# Patient Record
Sex: FEMALE | Race: WHITE | NOT HISPANIC OR LATINO | URBAN - METROPOLITAN AREA
[De-identification: names, ages, dates, MRNs, and addresses within clinical notes are randomized per-mention and may not be internally consistent; named-entity substitution may affect disease eponyms.]

---

## 2018-09-14 ENCOUNTER — OUTPATIENT (OUTPATIENT)
Dept: OUTPATIENT SERVICES | Facility: HOSPITAL | Age: 78
LOS: 1 days | End: 2018-09-14

## 2020-02-10 ENCOUNTER — APPOINTMENT (OUTPATIENT)
Dept: CARDIOLOGY | Facility: CLINIC | Age: 80
End: 2020-02-10
Payer: MEDICARE

## 2020-02-10 VITALS
WEIGHT: 224 LBS | DIASTOLIC BLOOD PRESSURE: 58 MMHG | SYSTOLIC BLOOD PRESSURE: 122 MMHG | HEIGHT: 64 IN | BODY MASS INDEX: 38.24 KG/M2 | OXYGEN SATURATION: 96 % | HEART RATE: 83 BPM

## 2020-02-10 DIAGNOSIS — I12.9 HYPERTENSIVE CHRONIC KIDNEY DISEASE WITH STAGE 1 THROUGH STAGE 4 CHRONIC KIDNEY DISEASE, OR UNSPECIFIED CHRONIC KIDNEY DISEASE: ICD-10-CM

## 2020-02-10 DIAGNOSIS — Z83.3 FAMILY HISTORY OF DIABETES MELLITUS: ICD-10-CM

## 2020-02-10 DIAGNOSIS — Z80.0 FAMILY HISTORY OF MALIGNANT NEOPLASM OF DIGESTIVE ORGANS: ICD-10-CM

## 2020-02-10 DIAGNOSIS — Z78.9 OTHER SPECIFIED HEALTH STATUS: ICD-10-CM

## 2020-02-10 DIAGNOSIS — Z82.0 FAMILY HISTORY OF EPILEPSY AND OTHER DISEASES OF THE NERVOUS SYSTEM: ICD-10-CM

## 2020-02-10 DIAGNOSIS — Z60.2 PROBLEMS RELATED TO LIVING ALONE: ICD-10-CM

## 2020-02-10 DIAGNOSIS — H93.8X9 OTHER SPECIFIED DISORDERS OF EAR, UNSPECIFIED EAR: ICD-10-CM

## 2020-02-10 DIAGNOSIS — Z82.49 FAMILY HISTORY OF ISCHEMIC HEART DISEASE AND OTHER DISEASES OF THE CIRCULATORY SYSTEM: ICD-10-CM

## 2020-02-10 PROCEDURE — 99203 OFFICE O/P NEW LOW 30 MIN: CPT

## 2020-02-10 SDOH — SOCIAL STABILITY - SOCIAL INSECURITY: PROBLEMS RELATED TO LIVING ALONE: Z60.2

## 2020-02-10 NOTE — PHYSICAL EXAM
[General Appearance - Well Developed] : well developed [Well Groomed] : well groomed [Normal Appearance] : normal appearance [General Appearance - Well Nourished] : well nourished [No Deformities] : no deformities [General Appearance - In No Acute Distress] : no acute distress [Normal Conjunctiva] : the conjunctiva exhibited no abnormalities [Eyelids - No Xanthelasma] : the eyelids demonstrated no xanthelasmas [Normal Oral Mucosa] : normal oral mucosa [No Oral Pallor] : no oral pallor [No Oral Cyanosis] : no oral cyanosis [Normal Jugular Venous A Waves Present] : normal jugular venous A waves present [Normal Jugular Venous V Waves Present] : normal jugular venous V waves present [No Jugular Venous Garcia A Waves] : no jugular venous garcia A waves [Heart Rate And Rhythm] : heart rate and rhythm were normal [Murmurs] : no murmurs present [Heart Sounds] : normal S1 and S2 [Respiration, Rhythm And Depth] : normal respiratory rhythm and effort [Auscultation Breath Sounds / Voice Sounds] : lungs were clear to auscultation bilaterally [Exaggerated Use Of Accessory Muscles For Inspiration] : no accessory muscle use [Abdomen Soft] : soft [Abdomen Mass (___ Cm)] : no abdominal mass palpated [Abdomen Tenderness] : non-tender [Gait - Sufficient For Exercise Testing] : the gait was sufficient for exercise testing [Abnormal Walk] : normal gait [Nail Clubbing] : no clubbing of the fingernails [Cyanosis, Localized] : no localized cyanosis [Petechial Hemorrhages (___cm)] : no petechial hemorrhages [Skin Color & Pigmentation] : normal skin color and pigmentation [] : no rash [No Venous Stasis] : no venous stasis [Skin Lesions] : no skin lesions [No Skin Ulcers] : no skin ulcer [No Xanthoma] : no  xanthoma was observed [Oriented To Time, Place, And Person] : oriented to person, place, and time [Affect] : the affect was normal [Mood] : the mood was normal [No Anxiety] : not feeling anxious

## 2020-02-10 NOTE — ASSESSMENT
[FreeTextEntry1] : Serenity is a 79-year-old female with medical history detailed above and active medical issues including:\par \par -Patient has dyspnea with moderate exertion, abnormal baseline EKG, multiple cardiac risk factors.  Patient will have noninvasive testing with a Lexiscan Myoview stress test to assess for obstructive CAD, exercise-induced arrhythmia,  blood pressure response, 2DEcho for LVEF, wall motion, structural heart disease,  carotid and abdominal ultrasound to assess for obstructive PAD. \par \par - Palpitations PVCs, unknown recent LVEF.  No recent palpitations.\par \par -Dependent leg edema, intermittent use of Lasix.  Patient instructed to maintain low salt diet, elevate legs when nonambulatory, knee-high compression stockings during the day and with prolonged travel.\par \par - Hypertension on losartan 50mg daily\par \par -Moderate obesity.  Discussed calorie reduction and increased exercise as a weight reduction strategy.\par \par -Family history of premature CAD, father MI age 62\par \par Patient will be seen in cardiology follow-up after noninvasive testing.\par \par Advised patient to follow active lifestyle with regular cardiovascular exercise. Patient educated on lifestyle and diet modification with low sodium low fat diet and avoidance of excessive alcohol. Patient is aware to call with any symptoms or concerns. \par \par Serenity will follow-up with Dr. Alvina Chilel for primary care

## 2020-02-10 NOTE — REASON FOR VISIT
[Consultation] : a consultation regarding [FreeTextEntry2] : DEL CID, edema, palpitation, PVCs, family history of premature CAD [FreeTextEntry1] : Serenity is a 79-year-old female with history of hypertension, palpitations, PVCs, obesity, dependent leg edema, family history of premature CAD.\par \par Patient has dyspnea with moderate exertion.  Patient has dependent leg edema on Lasix as needed.  patient has recurrent palpitations described as a fluttering no associated symptoms.  No recent palpitations.  Cardiovascular review of symptoms is negative for exertional chest pain, dizziness or syncope.  No PND or orthopnea.  No bleeding or black stool.\par \par No exercise routine.  It is walking less than 5 minutes unable to complete a treadmill stress test.  Patient will be scheduled for pharmacologic stress test.\par \par EKG sinus rhythm age undetermined septal MI unchanged from EKG December 2006\par \par Cardiology evaluation 2008 [facility], Dr. Pimentel for palpitations, abnormal Myoview stress test mild AMI abnormal EKG response, LVEF 58%.\par \par

## 2020-02-24 ENCOUNTER — APPOINTMENT (OUTPATIENT)
Dept: CARDIOLOGY | Facility: CLINIC | Age: 80
End: 2020-02-24
Payer: MEDICARE

## 2020-02-24 PROCEDURE — 93306 TTE W/DOPPLER COMPLETE: CPT

## 2020-02-24 PROCEDURE — 93979 VASCULAR STUDY: CPT

## 2020-02-24 PROCEDURE — 93880 EXTRACRANIAL BILAT STUDY: CPT

## 2020-07-02 ENCOUNTER — APPOINTMENT (OUTPATIENT)
Dept: CARDIOLOGY | Facility: CLINIC | Age: 80
End: 2020-07-02

## 2020-09-17 ENCOUNTER — APPOINTMENT (OUTPATIENT)
Dept: CARDIOLOGY | Facility: CLINIC | Age: 80
End: 2020-09-17

## 2020-09-25 ENCOUNTER — APPOINTMENT (OUTPATIENT)
Dept: CARDIOLOGY | Facility: CLINIC | Age: 80
End: 2020-09-25

## 2021-01-09 ENCOUNTER — TRANSCRIPTION ENCOUNTER (OUTPATIENT)
Age: 81
End: 2021-01-09

## 2021-02-06 ENCOUNTER — TRANSCRIPTION ENCOUNTER (OUTPATIENT)
Age: 81
End: 2021-02-06

## 2021-04-02 ENCOUNTER — OUTPATIENT (OUTPATIENT)
Dept: OUTPATIENT SERVICES | Facility: HOSPITAL | Age: 81
LOS: 1 days | End: 2021-04-02

## 2021-09-03 ENCOUNTER — APPOINTMENT (OUTPATIENT)
Dept: OPHTHALMOLOGY | Facility: CLINIC | Age: 81
End: 2021-09-03
Payer: MEDICARE

## 2021-09-03 ENCOUNTER — NON-APPOINTMENT (OUTPATIENT)
Age: 81
End: 2021-09-03

## 2021-09-03 PROCEDURE — 92004 COMPRE OPH EXAM NEW PT 1/>: CPT

## 2021-09-14 ENCOUNTER — NON-APPOINTMENT (OUTPATIENT)
Age: 81
End: 2021-09-14

## 2021-09-14 ENCOUNTER — APPOINTMENT (OUTPATIENT)
Dept: OPHTHALMOLOGY | Facility: CLINIC | Age: 81
End: 2021-09-14
Payer: MEDICARE

## 2021-09-14 PROCEDURE — 99213 OFFICE O/P EST LOW 20 MIN: CPT

## 2021-09-14 PROCEDURE — 76519 ECHO EXAM OF EYE: CPT

## 2021-09-23 ENCOUNTER — APPOINTMENT (OUTPATIENT)
Dept: OPHTHALMOLOGY | Facility: CLINIC | Age: 81
End: 2021-09-23

## 2021-10-01 ENCOUNTER — APPOINTMENT (OUTPATIENT)
Dept: OPHTHALMOLOGY | Facility: CLINIC | Age: 81
End: 2021-10-01

## 2021-10-14 ENCOUNTER — APPOINTMENT (OUTPATIENT)
Dept: OPHTHALMOLOGY | Facility: CLINIC | Age: 81
End: 2021-10-14

## 2021-10-22 ENCOUNTER — APPOINTMENT (OUTPATIENT)
Dept: OPHTHALMOLOGY | Facility: CLINIC | Age: 81
End: 2021-10-22

## 2021-11-10 ENCOUNTER — APPOINTMENT (OUTPATIENT)
Dept: OPHTHALMOLOGY | Facility: AMBULATORY MEDICAL SERVICES | Age: 81
End: 2021-11-10

## 2021-11-11 ENCOUNTER — APPOINTMENT (OUTPATIENT)
Dept: OPHTHALMOLOGY | Facility: CLINIC | Age: 81
End: 2021-11-11

## 2021-11-19 ENCOUNTER — APPOINTMENT (OUTPATIENT)
Dept: OPHTHALMOLOGY | Facility: CLINIC | Age: 81
End: 2021-11-19

## 2022-02-09 ENCOUNTER — APPOINTMENT (OUTPATIENT)
Dept: OPHTHALMOLOGY | Facility: AMBULATORY MEDICAL SERVICES | Age: 82
End: 2022-02-09

## 2022-02-10 ENCOUNTER — APPOINTMENT (OUTPATIENT)
Dept: OPHTHALMOLOGY | Facility: CLINIC | Age: 82
End: 2022-02-10

## 2022-02-18 ENCOUNTER — APPOINTMENT (OUTPATIENT)
Dept: OPHTHALMOLOGY | Facility: CLINIC | Age: 82
End: 2022-02-18

## 2022-03-03 ENCOUNTER — APPOINTMENT (OUTPATIENT)
Dept: OPHTHALMOLOGY | Facility: CLINIC | Age: 82
End: 2022-03-03
Payer: MEDICARE

## 2022-03-03 ENCOUNTER — NON-APPOINTMENT (OUTPATIENT)
Age: 82
End: 2022-03-03

## 2022-03-03 PROCEDURE — 92014 COMPRE OPH EXAM EST PT 1/>: CPT

## 2022-03-09 ENCOUNTER — NON-APPOINTMENT (OUTPATIENT)
Age: 82
End: 2022-03-09

## 2022-03-09 ENCOUNTER — APPOINTMENT (OUTPATIENT)
Dept: CARDIOLOGY | Facility: CLINIC | Age: 82
End: 2022-03-09
Payer: MEDICARE

## 2022-03-09 VITALS
TEMPERATURE: 98 F | DIASTOLIC BLOOD PRESSURE: 80 MMHG | SYSTOLIC BLOOD PRESSURE: 118 MMHG | BODY MASS INDEX: 41.46 KG/M2 | WEIGHT: 234 LBS | OXYGEN SATURATION: 98 % | HEIGHT: 63 IN | HEART RATE: 92 BPM

## 2022-03-09 PROCEDURE — 99215 OFFICE O/P EST HI 40 MIN: CPT

## 2022-03-09 PROCEDURE — 93000 ELECTROCARDIOGRAM COMPLETE: CPT

## 2022-03-09 RX ORDER — FUROSEMIDE 10 MG/ML
10 SOLUTION ORAL
Refills: 0 | Status: DISCONTINUED | COMMUNITY
End: 2022-03-09

## 2022-03-09 RX ORDER — ATORVASTATIN CALCIUM 10 MG/1
10 TABLET, FILM COATED ORAL DAILY
Refills: 0 | Status: DISCONTINUED | COMMUNITY
End: 2022-03-09

## 2022-03-09 NOTE — PHYSICAL EXAM
[General Appearance - Well Developed] : well developed [Normal Appearance] : normal appearance [Well Groomed] : well groomed [General Appearance - Well Nourished] : well nourished [No Deformities] : no deformities [General Appearance - In No Acute Distress] : no acute distress [Normal Conjunctiva] : the conjunctiva exhibited no abnormalities [Eyelids - No Xanthelasma] : the eyelids demonstrated no xanthelasmas [Normal Oral Mucosa] : normal oral mucosa [No Oral Pallor] : no oral pallor [No Oral Cyanosis] : no oral cyanosis [Normal Jugular Venous V Waves Present] : normal jugular venous V waves present [No Jugular Venous Garcia A Waves] : no jugular venous garcia A waves [Heart Rate And Rhythm] : heart rate and rhythm were normal [Respiration, Rhythm And Depth] : normal respiratory rhythm and effort [Abdomen Soft] : soft [Abdomen Tenderness] : non-tender [Nail Clubbing] : no clubbing of the fingernails [Cyanosis, Localized] : no localized cyanosis [Skin Color & Pigmentation] : normal skin color and pigmentation [] : no rash [No Venous Stasis] : no venous stasis [Skin Lesions] : no skin lesions [No Skin Ulcers] : no skin ulcer [No Xanthoma] : no  xanthoma was observed [Oriented To Time, Place, And Person] : oriented to person, place, and time [Affect] : the affect was normal [Mood] : the mood was normal [No Anxiety] : not feeling anxious [Auscultation Breath Sounds / Voice Sounds] : lungs were clear to auscultation bilaterally [FreeTextEntry1] : 1+ edema b/l

## 2022-03-09 NOTE — DISCUSSION/SUMMARY
[FreeTextEntry1] : \par 82 yo F with moderate carotid stenosis, HTN, HLD, obesity, former smoker, CKD here for follow up.\par \par \par # Progressive DEL CID: abnormal NST in 2/2020 and q wave anterior on ekg\par - TTE given murmur and sx\par - consideration was for CCTA however I am wishing to avoid contrast unless for a coronary angiogram. Will start with a pharm NST and very low threshold for a coronary angiogram given risk factors/sx/abnormal ekg\par - increase lasix to 20 po for now. labs ordered.\par - Patient instructed to maintain low salt diet, elevate legs when nonambulatory, knee-high compression stockings during the day and with prolonged travel.\par \par # HTN well controlled:\par - continue losartan 50. lasix 20. low Na diet.\par \par # PAD (mod b/l carotid stenosis):\par - recheck ordered\par - start rosuva 5\par - further discuss asa 81 at OV\par \par # Obesity: \par - need to further discuss home sleep study\par \par \par Follow up after testing. \par

## 2022-03-09 NOTE — REASON FOR VISIT
[Follow-Up - Clinic] : a clinic follow-up of [Abnormal Test Result] : an abnormal test result [Peripheral Vascular Disease] : peripheral vascular disease

## 2022-03-09 NOTE — HISTORY OF PRESENT ILLNESS
[FreeTextEntry1] : \par 82 yo F with moderate carotid stenosis, HTN, HLD, obesity, former smoker, CKD here for follow up.\par \par 3/2022:\par [facility] ER for DEL CID. DEL CID worsening over past 2 weeks with cough. Baseline LE edema. No angina. had abnormal tread nst 2/2020.\par \par 2020: "dyspnea with moderate exertion.  Patient has dependent leg edema on Lasix as needed.  patient has recurrent palpitations described as a fluttering no associated symptoms."\par \par \par TESTING:\par 3/2022:\par LABWORK: Cr 1.19. BUN 23. normal LFT. NTBNP 85. normal cbc.\par EKG: q wave anterior\par \par \par 2/2020:\par TREADMILL NUCLEAR STRESS TEST: 7 METS ischemic ekg and mild infarction anterior region\par CAROTID DUPLEX: moderate bilateral\par TTE: grossly unremarkable. G1DD. \par no AAA\par \par \par

## 2022-03-22 ENCOUNTER — APPOINTMENT (OUTPATIENT)
Dept: CARDIOLOGY | Facility: CLINIC | Age: 82
End: 2022-03-22
Payer: MEDICARE

## 2022-03-22 ENCOUNTER — NON-APPOINTMENT (OUTPATIENT)
Age: 82
End: 2022-03-22

## 2022-03-22 PROCEDURE — 93306 TTE W/DOPPLER COMPLETE: CPT

## 2022-03-22 PROCEDURE — 93880 EXTRACRANIAL BILAT STUDY: CPT

## 2022-03-23 ENCOUNTER — APPOINTMENT (OUTPATIENT)
Dept: OPHTHALMOLOGY | Facility: AMBULATORY MEDICAL SERVICES | Age: 82
End: 2022-03-23
Payer: MEDICARE

## 2022-03-23 PROCEDURE — 66984 XCAPSL CTRC RMVL W/O ECP: CPT | Mod: LT

## 2022-03-24 ENCOUNTER — NON-APPOINTMENT (OUTPATIENT)
Age: 82
End: 2022-03-24

## 2022-03-24 ENCOUNTER — APPOINTMENT (OUTPATIENT)
Dept: OPHTHALMOLOGY | Facility: CLINIC | Age: 82
End: 2022-03-24
Payer: MEDICARE

## 2022-03-24 PROCEDURE — 99024 POSTOP FOLLOW-UP VISIT: CPT

## 2022-03-31 ENCOUNTER — APPOINTMENT (OUTPATIENT)
Dept: CARDIOLOGY | Facility: CLINIC | Age: 82
End: 2022-03-31

## 2022-03-31 ENCOUNTER — NON-APPOINTMENT (OUTPATIENT)
Age: 82
End: 2022-03-31

## 2022-04-01 ENCOUNTER — APPOINTMENT (OUTPATIENT)
Dept: OPHTHALMOLOGY | Facility: CLINIC | Age: 82
End: 2022-04-01
Payer: MEDICARE

## 2022-04-01 ENCOUNTER — NON-APPOINTMENT (OUTPATIENT)
Age: 82
End: 2022-04-01

## 2022-04-01 PROCEDURE — 99024 POSTOP FOLLOW-UP VISIT: CPT

## 2022-04-04 ENCOUNTER — APPOINTMENT (OUTPATIENT)
Dept: CARDIOLOGY | Facility: CLINIC | Age: 82
End: 2022-04-04
Payer: MEDICARE

## 2022-04-04 VITALS
HEIGHT: 63 IN | OXYGEN SATURATION: 97 % | HEART RATE: 80 BPM | TEMPERATURE: 97.1 F | SYSTOLIC BLOOD PRESSURE: 118 MMHG | DIASTOLIC BLOOD PRESSURE: 70 MMHG | WEIGHT: 228 LBS | BODY MASS INDEX: 40.4 KG/M2

## 2022-04-04 PROCEDURE — 99215 OFFICE O/P EST HI 40 MIN: CPT

## 2022-04-04 NOTE — HISTORY OF PRESENT ILLNESS
[FreeTextEntry1] : \par 80 yo F with moderate carotid stenosis, HTN, HLD, obesity, former smoker, CKD here for follow up.\par \par 4/2022: interim testing. tte and carotid, discussion regarding ischemia evaluation testing she declined stress test. reports dyspnea unchanged. weight loss. CXR small airway disease. \par \par 3/2022:\par [facility] ER for DEL CID. DEL CID worsening over past 2 weeks with cough. Baseline LE edema. No angina. had abnormal tread nst 2/2020.\par \par 2020: "dyspnea with moderate exertion.  Patient has dependent leg edema on Lasix as needed.  patient has recurrent palpitations described as a fluttering no associated symptoms."\par \par \par TESTING:\par 3/2022:\par LABWORK: Cr 1.22. normal lft. a1c 6.3. normal tft. hgb 12.5. . trig 168. \par CAROTID DUPLEX: moderate bilateral\par TTE: preserved biventricular function. hyperdynamic. normal rv. \par LABWORK: Cr 1.19. BUN 23. normal LFT. NTBNP 85. normal cbc.\par EKG: q wave anterior\par CXR: mild peribronchial thickening \par \par 2/2020:\par TREADMILL NUCLEAR STRESS TEST: 7 METS ischemic ekg and mild infarction anterior region\par CAROTID DUPLEX: moderate bilateral\par TTE: grossly unremarkable. G1DD. \par no AAA\par \par \par

## 2022-04-04 NOTE — PHYSICAL EXAM
[General Appearance - Well Developed] : well developed [Normal Appearance] : normal appearance [Well Groomed] : well groomed [General Appearance - Well Nourished] : well nourished [No Deformities] : no deformities [General Appearance - In No Acute Distress] : no acute distress [Normal Conjunctiva] : the conjunctiva exhibited no abnormalities [Eyelids - No Xanthelasma] : the eyelids demonstrated no xanthelasmas [Normal Oral Mucosa] : normal oral mucosa [No Oral Pallor] : no oral pallor [No Oral Cyanosis] : no oral cyanosis [Normal Jugular Venous V Waves Present] : normal jugular venous V waves present [No Jugular Venous Garcia A Waves] : no jugular venous garcia A waves [Heart Rate And Rhythm] : heart rate and rhythm were normal [Respiration, Rhythm And Depth] : normal respiratory rhythm and effort [Auscultation Breath Sounds / Voice Sounds] : lungs were clear to auscultation bilaterally [Abdomen Soft] : soft [Abdomen Tenderness] : non-tender [Nail Clubbing] : no clubbing of the fingernails [Cyanosis, Localized] : no localized cyanosis [Skin Color & Pigmentation] : normal skin color and pigmentation [] : no rash [No Venous Stasis] : no venous stasis [Skin Lesions] : no skin lesions [No Skin Ulcers] : no skin ulcer [No Xanthoma] : no  xanthoma was observed [Oriented To Time, Place, And Person] : oriented to person, place, and time [Affect] : the affect was normal [Mood] : the mood was normal [No Anxiety] : not feeling anxious [FreeTextEntry1] : 1+ edema b/l

## 2022-04-04 NOTE — REASON FOR VISIT
[Follow-Up - Clinic] : a clinic follow-up of [Abnormal Test Result] : an abnormal test result [Peripheral Vascular Disease] : peripheral vascular disease [Dyspnea] : dyspnea [Medication Management] : Medication management

## 2022-04-15 ENCOUNTER — INPATIENT (INPATIENT)
Facility: HOSPITAL | Age: 82
LOS: 0 days | Discharge: HOME | End: 2022-04-16
Attending: INTERNAL MEDICINE | Admitting: INTERNAL MEDICINE
Payer: MEDICARE

## 2022-04-15 PROCEDURE — 92920 PRQ TRLUML C ANGIOP 1ART&/BR: CPT | Mod: LD

## 2022-04-15 PROCEDURE — 93010 ELECTROCARDIOGRAM REPORT: CPT | Mod: 76

## 2022-04-15 PROCEDURE — 99223 1ST HOSP IP/OBS HIGH 75: CPT

## 2022-04-15 PROCEDURE — 93458 L HRT ARTERY/VENTRICLE ANGIO: CPT | Mod: 26,XU

## 2022-04-16 ENCOUNTER — NON-APPOINTMENT (OUTPATIENT)
Age: 82
End: 2022-04-16

## 2022-04-16 PROCEDURE — 93010 ELECTROCARDIOGRAM REPORT: CPT

## 2022-04-16 PROCEDURE — 99239 HOSP IP/OBS DSCHRG MGMT >30: CPT

## 2022-04-18 RX ORDER — CLOPIDOGREL BISULFATE 75 MG/1
75 TABLET, FILM COATED ORAL DAILY
Qty: 7 | Refills: 0 | Status: DISCONTINUED | COMMUNITY
Start: 2022-04-17 | End: 2022-04-18

## 2022-04-19 ENCOUNTER — OUTPATIENT (OUTPATIENT)
Dept: OUTPATIENT SERVICES | Facility: HOSPITAL | Age: 82
LOS: 1 days | Discharge: HOME | End: 2022-04-19
Payer: MEDICARE

## 2022-04-19 DIAGNOSIS — I12.9 HYPERTENSIVE CHRONIC KIDNEY DISEASE WITH STAGE 1 THROUGH STAGE 4 CHRONIC KIDNEY DISEASE, OR UNSPECIFIED CHRONIC KIDNEY DISEASE: ICD-10-CM

## 2022-04-19 DIAGNOSIS — Z87.891 PERSONAL HISTORY OF NICOTINE DEPENDENCE: ICD-10-CM

## 2022-04-19 DIAGNOSIS — I73.9 PERIPHERAL VASCULAR DISEASE, UNSPECIFIED: ICD-10-CM

## 2022-04-19 DIAGNOSIS — Z82.49 FAMILY HISTORY OF ISCHEMIC HEART DISEASE AND OTHER DISEASES OF THE CIRCULATORY SYSTEM: ICD-10-CM

## 2022-04-19 DIAGNOSIS — I25.10 ATHEROSCLEROTIC HEART DISEASE OF NATIVE CORONARY ARTERY WITHOUT ANGINA PECTORIS: ICD-10-CM

## 2022-04-19 DIAGNOSIS — I65.23 OCCLUSION AND STENOSIS OF BILATERAL CAROTID ARTERIES: ICD-10-CM

## 2022-04-19 DIAGNOSIS — Z83.3 FAMILY HISTORY OF DIABETES MELLITUS: ICD-10-CM

## 2022-04-19 DIAGNOSIS — R73.03 PREDIABETES: ICD-10-CM

## 2022-04-19 DIAGNOSIS — Z20.822 CONTACT WITH AND (SUSPECTED) EXPOSURE TO COVID-19: ICD-10-CM

## 2022-04-19 DIAGNOSIS — E66.9 OBESITY, UNSPECIFIED: ICD-10-CM

## 2022-04-19 DIAGNOSIS — N18.9 CHRONIC KIDNEY DISEASE, UNSPECIFIED: ICD-10-CM

## 2022-04-19 DIAGNOSIS — I25.111 ATHEROSCLEROTIC HEART DISEASE OF NATIVE CORONARY ARTERY WITH ANGINA PECTORIS WITH DOCUMENTED SPASM: ICD-10-CM

## 2022-04-19 PROCEDURE — 92978 ENDOLUMINL IVUS OCT C 1ST: CPT | Mod: 26,RC

## 2022-04-19 PROCEDURE — 93010 ELECTROCARDIOGRAM REPORT: CPT | Mod: 77

## 2022-04-19 PROCEDURE — 93458 L HRT ARTERY/VENTRICLE ANGIO: CPT | Mod: 26,XU

## 2022-04-19 PROCEDURE — 92928 PRQ TCAT PLMT NTRAC ST 1 LES: CPT | Mod: RC

## 2022-04-19 PROCEDURE — 93010 ELECTROCARDIOGRAM REPORT: CPT

## 2022-04-20 DIAGNOSIS — N18.9 CHRONIC KIDNEY DISEASE, UNSPECIFIED: ICD-10-CM

## 2022-04-20 DIAGNOSIS — I12.9 HYPERTENSIVE CHRONIC KIDNEY DISEASE WITH STAGE 1 THROUGH STAGE 4 CHRONIC KIDNEY DISEASE, OR UNSPECIFIED CHRONIC KIDNEY DISEASE: ICD-10-CM

## 2022-04-20 DIAGNOSIS — R06.02 SHORTNESS OF BREATH: ICD-10-CM

## 2022-04-20 DIAGNOSIS — Z82.49 FAMILY HISTORY OF ISCHEMIC HEART DISEASE AND OTHER DISEASES OF THE CIRCULATORY SYSTEM: ICD-10-CM

## 2022-04-20 DIAGNOSIS — E66.9 OBESITY, UNSPECIFIED: ICD-10-CM

## 2022-04-20 DIAGNOSIS — R60.0 LOCALIZED EDEMA: ICD-10-CM

## 2022-04-20 DIAGNOSIS — I25.10 ATHEROSCLEROTIC HEART DISEASE OF NATIVE CORONARY ARTERY WITHOUT ANGINA PECTORIS: ICD-10-CM

## 2022-04-20 DIAGNOSIS — E78.5 HYPERLIPIDEMIA, UNSPECIFIED: ICD-10-CM

## 2022-04-20 DIAGNOSIS — Z79.02 LONG TERM (CURRENT) USE OF ANTITHROMBOTICS/ANTIPLATELETS: ICD-10-CM

## 2022-04-20 DIAGNOSIS — I65.23 OCCLUSION AND STENOSIS OF BILATERAL CAROTID ARTERIES: ICD-10-CM

## 2022-04-20 DIAGNOSIS — R73.03 PREDIABETES: ICD-10-CM

## 2022-04-20 DIAGNOSIS — Z79.82 LONG TERM (CURRENT) USE OF ASPIRIN: ICD-10-CM

## 2022-04-20 DIAGNOSIS — Z87.891 PERSONAL HISTORY OF NICOTINE DEPENDENCE: ICD-10-CM

## 2022-04-21 ENCOUNTER — NON-APPOINTMENT (OUTPATIENT)
Age: 82
End: 2022-04-21

## 2022-04-22 ENCOUNTER — NON-APPOINTMENT (OUTPATIENT)
Age: 82
End: 2022-04-22

## 2022-04-22 ENCOUNTER — APPOINTMENT (OUTPATIENT)
Dept: CARDIOLOGY | Facility: CLINIC | Age: 82
End: 2022-04-22
Payer: MEDICARE

## 2022-04-22 VITALS
HEIGHT: 63 IN | TEMPERATURE: 96.9 F | SYSTOLIC BLOOD PRESSURE: 130 MMHG | BODY MASS INDEX: 42.17 KG/M2 | WEIGHT: 238 LBS | HEART RATE: 82 BPM | OXYGEN SATURATION: 98 % | DIASTOLIC BLOOD PRESSURE: 70 MMHG

## 2022-04-22 PROCEDURE — 93000 ELECTROCARDIOGRAM COMPLETE: CPT

## 2022-04-22 PROCEDURE — 99215 OFFICE O/P EST HI 40 MIN: CPT

## 2022-04-22 RX ORDER — LOTEPREDNOL ETABONATE 10 MG/ML
1 SUSPENSION TOPICAL
Qty: 3 | Refills: 0 | Status: DISCONTINUED | COMMUNITY
Start: 2022-03-03 | End: 2022-04-22

## 2022-04-22 RX ORDER — BROMFENAC SODIUM 0.7 MG/ML
0.07 SOLUTION/ DROPS OPHTHALMIC
Qty: 3 | Refills: 0 | Status: DISCONTINUED | COMMUNITY
Start: 2021-09-14 | End: 2022-04-22

## 2022-04-22 RX ORDER — MOXIFLOXACIN OPHTHALMIC 5 MG/ML
0.5 SOLUTION/ DROPS OPHTHALMIC
Qty: 3 | Refills: 0 | Status: DISCONTINUED | COMMUNITY
Start: 2022-03-03 | End: 2022-04-22

## 2022-04-22 NOTE — HISTORY OF PRESENT ILLNESS
[FreeTextEntry1] : \par 82 yo F with CAD with PCI proximal RCA 4/2022, moderate carotid stenosis, HTN, HLD, obesity, former smoker, CKD here for follow up.\par \par 4/22/22 VISIT: Underwent PCI.  Right radial site well-healing.  Patent neurovascular status.  Testing as detailed below.  Creatinine improved to 1.0.  Her dyspnea has much improved/resolved.  She does now have palpitations appears to correlate with PVCs today.  No angina.  Not taking Lasix as prescribed.  Did increase her rosuvastatin.\par \par 4/4/2022: interim testing. tte and carotid, discussion regarding ischemia evaluation testing she declined stress test. reports dyspnea unchanged. weight loss. CXR small airway disease. \par \par 3/2022: [facility] ER for DEL CID. DEL CID worsening over past 2 weeks with cough. Baseline LE edema. No angina. had abnormal tread nst 2/2020.\par \par 2020: "dyspnea with moderate exertion.  Patient has dependent leg edema on Lasix as needed.  patient has recurrent palpitations described as a fluttering no associated symptoms."\par \par \par TESTING:\par 4/2022:\par CORONARY ANGIOGRAM: IVUS guided PCI proximal RCA 2.75 x 18 [place] point (postdilated 3.5).  Mildly elevated LVEDP.  D2 50%.\par EKG: NSR with PVCs.\par \par 3/2022:\par LABWORK: Cr 1.22. normal lft. a1c 6.3. normal tft. hgb 12.5. . trig 168. \par CAROTID DUPLEX: moderate bilateral\par TTE: preserved biventricular function. hyperdynamic. normal rv. \par LABWORK: Cr 1.19. BUN 23. normal LFT. NTBNP 85. normal cbc.\par EKG: q wave anterior\par CXR: mild peribronchial thickening \par \par 2/2020:\par TREADMILL NUCLEAR STRESS TEST: 7 METS ischemic ekg and mild infarction anterior region\par CAROTID DUPLEX: moderate bilateral\par TTE: grossly unremarkable. G1DD. \par no AAA\par \par \par

## 2022-04-22 NOTE — PHYSICAL EXAM
[General Appearance - Well Developed] : well developed [Normal Appearance] : normal appearance [Well Groomed] : well groomed [General Appearance - Well Nourished] : well nourished [No Deformities] : no deformities [General Appearance - In No Acute Distress] : no acute distress [Normal Conjunctiva] : the conjunctiva exhibited no abnormalities [Eyelids - No Xanthelasma] : the eyelids demonstrated no xanthelasmas [Normal Oral Mucosa] : normal oral mucosa [No Oral Pallor] : no oral pallor [No Oral Cyanosis] : no oral cyanosis [Normal Jugular Venous V Waves Present] : normal jugular venous V waves present [No Jugular Venous Garcia A Waves] : no jugular venous garcia A waves [Heart Rate And Rhythm] : heart rate and rhythm were normal [Respiration, Rhythm And Depth] : normal respiratory rhythm and effort [Auscultation Breath Sounds / Voice Sounds] : lungs were clear to auscultation bilaterally [Abdomen Soft] : soft [Abdomen Tenderness] : non-tender [Nail Clubbing] : no clubbing of the fingernails [Cyanosis, Localized] : no localized cyanosis [Skin Color & Pigmentation] : normal skin color and pigmentation [] : no rash [No Venous Stasis] : no venous stasis [Skin Lesions] : no skin lesions [No Skin Ulcers] : no skin ulcer [No Xanthoma] : no  xanthoma was observed [Oriented To Time, Place, And Person] : oriented to person, place, and time [Affect] : the affect was normal [Mood] : the mood was normal [No Anxiety] : not feeling anxious [FreeTextEntry1] : Trace edema b/l

## 2022-04-22 NOTE — REASON FOR VISIT
[Follow-Up - Clinic] : a clinic follow-up of [Abnormal Test Result] : an abnormal test result [Dyspnea] : dyspnea [Medication Management] : Medication management [Peripheral Vascular Disease] : peripheral vascular disease [Symptom and Test Evaluation] : symptom and test evaluation [CV Risk Factors and Non-Cardiac Disease] : CV risk factors and non-cardiac disease [Coronary Artery Disease] : coronary artery disease

## 2022-04-24 ENCOUNTER — NON-APPOINTMENT (OUTPATIENT)
Age: 82
End: 2022-04-24

## 2022-04-28 PROCEDURE — 93224 XTRNL ECG REC UP TO 48 HRS: CPT

## 2022-05-03 ENCOUNTER — APPOINTMENT (OUTPATIENT)
Dept: OPHTHALMOLOGY | Facility: CLINIC | Age: 82
End: 2022-05-03

## 2022-05-05 ENCOUNTER — NON-APPOINTMENT (OUTPATIENT)
Age: 82
End: 2022-05-05

## 2022-05-10 ENCOUNTER — OUTPATIENT (OUTPATIENT)
Dept: OUTPATIENT SERVICES | Facility: HOSPITAL | Age: 82
LOS: 1 days | End: 2022-05-10

## 2022-05-11 ENCOUNTER — APPOINTMENT (OUTPATIENT)
Dept: CARDIOLOGY | Facility: CLINIC | Age: 82
End: 2022-05-11
Payer: MEDICARE

## 2022-05-11 VITALS
SYSTOLIC BLOOD PRESSURE: 146 MMHG | HEART RATE: 71 BPM | DIASTOLIC BLOOD PRESSURE: 80 MMHG | HEIGHT: 63 IN | TEMPERATURE: 96.9 F | OXYGEN SATURATION: 97 % | BODY MASS INDEX: 41.82 KG/M2 | WEIGHT: 236 LBS

## 2022-05-11 DIAGNOSIS — I25.10 ATHEROSCLEROTIC HEART DISEASE OF NATIVE CORONARY ARTERY WITHOUT ANGINA PECTORIS: ICD-10-CM

## 2022-05-11 DIAGNOSIS — Z95.5 PRESENCE OF CORONARY ANGIOPLASTY IMPLANT AND GRAFT: ICD-10-CM

## 2022-05-11 PROCEDURE — 99214 OFFICE O/P EST MOD 30 MIN: CPT

## 2022-05-11 NOTE — REASON FOR VISIT
[Symptom and Test Evaluation] : symptom and test evaluation [CV Risk Factors and Non-Cardiac Disease] : CV risk factors and non-cardiac disease [Coronary Artery Disease] : coronary artery disease [Follow-Up - Clinic] : a clinic follow-up of [Abnormal Test Result] : an abnormal test result [Dyspnea] : dyspnea [Medication Management] : Medication management [Peripheral Vascular Disease] : peripheral vascular disease

## 2022-05-12 ENCOUNTER — NON-APPOINTMENT (OUTPATIENT)
Age: 82
End: 2022-05-12

## 2022-05-12 ENCOUNTER — APPOINTMENT (OUTPATIENT)
Dept: OPHTHALMOLOGY | Facility: CLINIC | Age: 82
End: 2022-05-12
Payer: MEDICARE

## 2022-05-12 PROCEDURE — 99213 OFFICE O/P EST LOW 20 MIN: CPT | Mod: 24

## 2022-05-12 PROCEDURE — 92136 OPHTHALMIC BIOMETRY: CPT | Mod: 26,RT

## 2022-05-15 NOTE — DISCUSSION/SUMMARY
[FreeTextEntry1] : \par # CAD with PCI proximal RCA 4/2022, HFpEF, now has resolved DEL CID:\par -Continue DAPT for at least 6 months 10/2022.  Then will consider Plavix monotherapy.  Ordered cardiac rehab.\par -recently increased rosuvastatin to 10\par -increase Lasix to 40 p.o.  maintain low salt diet, elevate legs when nonambulatory, knee-high compression stockings during the day and with prolonged travel. exercise. labwork.\par \par # Palpitations with 6.3% PVCs:\par - started metoprolol, repeat monitor in 2 months preclinic. EKG otherwise unchanged.\par - labs pending\par \par # HTN uncontrolled: high Na\par - continue losartan 50. \par - INCREASE lasix to 40 and start metoprolol 25. bp log. low Na diet.\par \par # PAD (mod b/l carotid stenosis): stable. recheck in 6 months due 10/2022\par -Continue rosuvastatin 10\par -Antiplatelets\par \par # Obesity: \par - have ordered home sleep study and strongly encouraged getting again\par \par \par Follow up in 2 months with 24 hour holter monitor. ER precautions given to patient.\par \par

## 2022-05-15 NOTE — HISTORY OF PRESENT ILLNESS
[FreeTextEntry1] : \par 80 yo F with CAD with PCI proximal RCA 4/2022, moderate carotid stenosis, HTN, HLD, obesity, former smoker, CKD here for follow up.\par \par 5/2022 VISIT: 6.3% PVCs, did not start metoprolol yet. no angina. still with dyspnea and palpitations correlating with pvcs. no nsvt noted.\par \par 4/22/22 VISIT: Underwent PCI.  Right radial site well-healing.  Patent neurovascular status.  Testing as detailed below.  Creatinine improved to 1.0.  Her dyspnea has much improved/resolved.  She does now have palpitations appears to correlate with PVCs today.  No angina.  Not taking Lasix as prescribed.  Did increase her rosuvastatin.\par \par 4/4/2022: interim testing. tte and carotid, discussion regarding ischemia evaluation testing she declined stress test. reports dyspnea unchanged. weight loss. CXR small airway disease. \par \par 3/2022: [facility] ER for DEL CID. DEL CID worsening over past 2 weeks with cough. Baseline LE edema. No angina. had abnormal tread nst 2/2020.\par \par 2020: "dyspnea with moderate exertion.  Patient has dependent leg edema on Lasix as needed.  patient has recurrent palpitations described as a fluttering no associated symptoms."\par \par \par TESTING:\par 4/2022:\par 24 hour HOLTER: 6.3% PVCs no nsvt.\par CORONARY ANGIOGRAM: IVUS guided PCI proximal RCA 2.75 x 18 [place] (postdilated 3.5).  Mildly elevated LVEDP.  D2 50%.\par EKG: NSR with PVCs.\par \par 3/2022:\par LABWORK: Cr 1.22. normal lft. a1c 6.3. normal tft. hgb 12.5. . trig 168. \par CAROTID DUPLEX: moderate bilateral\par TTE: preserved biventricular function. hyperdynamic. normal rv. \par LABWORK: Cr 1.19. BUN 23. normal LFT. NTBNP 85. normal cbc.\par EKG: q wave anterior\par CXR: mild peribronchial thickening \par \par 2/2020:\par TREADMILL NUCLEAR STRESS TEST: 7 METS ischemic ekg and mild infarction anterior region\par CAROTID DUPLEX: moderate bilateral\par TTE: grossly unremarkable. G1DD. \par no AAA\par \par \par

## 2022-05-18 ENCOUNTER — APPOINTMENT (OUTPATIENT)
Age: 82
End: 2022-05-18

## 2022-05-19 ENCOUNTER — NON-APPOINTMENT (OUTPATIENT)
Age: 82
End: 2022-05-19

## 2022-05-25 ENCOUNTER — APPOINTMENT (OUTPATIENT)
Dept: OPHTHALMOLOGY | Facility: AMBULATORY MEDICAL SERVICES | Age: 82
End: 2022-05-25

## 2022-05-26 ENCOUNTER — APPOINTMENT (OUTPATIENT)
Dept: OPHTHALMOLOGY | Facility: CLINIC | Age: 82
End: 2022-05-26

## 2022-06-01 ENCOUNTER — NON-APPOINTMENT (OUTPATIENT)
Age: 82
End: 2022-06-01

## 2022-06-03 ENCOUNTER — APPOINTMENT (OUTPATIENT)
Dept: OPHTHALMOLOGY | Facility: CLINIC | Age: 82
End: 2022-06-03

## 2022-06-06 ENCOUNTER — NON-APPOINTMENT (OUTPATIENT)
Age: 82
End: 2022-06-06

## 2022-06-22 ENCOUNTER — RX RENEWAL (OUTPATIENT)
Age: 82
End: 2022-06-22

## 2022-06-23 ENCOUNTER — NON-APPOINTMENT (OUTPATIENT)
Age: 82
End: 2022-06-23

## 2022-07-15 ENCOUNTER — APPOINTMENT (OUTPATIENT)
Dept: CARDIOLOGY | Facility: CLINIC | Age: 82
End: 2022-07-15

## 2022-07-18 ENCOUNTER — APPOINTMENT (OUTPATIENT)
Dept: CARDIOLOGY | Facility: CLINIC | Age: 82
End: 2022-07-18

## 2022-07-19 ENCOUNTER — APPOINTMENT (OUTPATIENT)
Dept: CARDIOLOGY | Facility: CLINIC | Age: 82
End: 2022-07-19

## 2022-07-22 PROCEDURE — 93224 XTRNL ECG REC UP TO 48 HRS: CPT

## 2022-08-05 ENCOUNTER — APPOINTMENT (OUTPATIENT)
Dept: CARDIOLOGY | Facility: CLINIC | Age: 82
End: 2022-08-05

## 2022-08-05 VITALS
WEIGHT: 232 LBS | BODY MASS INDEX: 41.11 KG/M2 | OXYGEN SATURATION: 95 % | HEIGHT: 63 IN | DIASTOLIC BLOOD PRESSURE: 68 MMHG | SYSTOLIC BLOOD PRESSURE: 124 MMHG | HEART RATE: 76 BPM

## 2022-08-05 PROCEDURE — 99215 OFFICE O/P EST HI 40 MIN: CPT

## 2022-08-05 RX ORDER — METOPROLOL SUCCINATE 25 MG/1
25 TABLET, EXTENDED RELEASE ORAL DAILY
Qty: 90 | Refills: 3 | Status: DISCONTINUED | COMMUNITY
Start: 2022-04-22 | End: 2022-08-05

## 2022-08-05 NOTE — HISTORY OF PRESENT ILLNESS
[FreeTextEntry1] : \par 81 yo F with CAD with PCI proximal RCA 4/2022, moderate carotid stenosis, HTN, HLD, obesity, former smoker, dm2, CKD here for follow up.\par \par 8/2022 VISIT: no metoprolol.  No angina.  PVCs improved with improved volume status.  Doing cardiac rehab.\par \par 5/2022 VISIT: 6.3% PVCs, did not start metoprolol yet. no angina. still with dyspnea and palpitations correlating with pvcs. no nsvt noted.\par \par 4/22/22 VISIT: Underwent PCI.  Right radial site well-healing.  Patent neurovascular status.  Testing as detailed below.  Creatinine improved to 1.0.  Her dyspnea has much improved/resolved.  She does now have palpitations appears to correlate with PVCs today.  No angina.  Not taking Lasix as prescribed.  Did increase her rosuvastatin.\par \par 4/4/2022: interim testing. tte and carotid, discussion regarding ischemia evaluation testing she declined stress test. reports dyspnea unchanged. weight loss. CXR small airway disease. \par \par 3/2022: [facility] ER for DEL CID. DEL CID worsening over past 2 weeks with cough. Baseline LE edema. No angina. had abnormal tread nst 2/2020.\par \par 2020: "dyspnea with moderate exertion.  Patient has dependent leg edema on Lasix as needed.  patient has recurrent palpitations described as a fluttering no associated symptoms."\par \par \par \par TESTING: \par 8/2022: LDL 96. cr 1.23. trig 168. a1c 6.. \par 24-hour monitor:0.4% pvc. \par \par 6/2022 labwork: cr 1.25. trig 180. LDL 87. \par \par 4/2022:\par 24 hour HOLTER: 6.3% PVCs no nsvt.\par CORONARY ANGIOGRAM: IVUS guided PCI proximal RCA 2.75 x 18 [place] (postdilated 3.5).  Mildly elevated LVEDP.  D2 50%.\par EKG: NSR with PVCs.\par \par 3/2022:\par LABWORK: Cr 1.22. normal lft. a1c 6.3. normal tft. hgb 12.5. . trig 168. \par CAROTID DUPLEX: moderate bilateral\par TTE: preserved biventricular function. hyperdynamic. normal rv. \par LABWORK: Cr 1.19. BUN 23. normal LFT. NTBNP 85. normal cbc.\par EKG: q wave anterior\par CXR: mild peribronchial thickening \par \par 2/2020:\par TREADMILL NUCLEAR STRESS TEST: 7 METS ischemic ekg and mild infarction anterior region\par CAROTID DUPLEX: moderate bilateral\par TTE: grossly unremarkable. G1DD. \par no AAA\par \par \par

## 2022-08-05 NOTE — DISCUSSION/SUMMARY
[FreeTextEntry1] : \par # CAD with PCI proximal RCA 4/2022, HFpEF, now has resolved DEL CID:\par -Continue DAPT until 6-month return visit.  Then will consider Plavix monotherapy.  cardiac rehab.\par - increased rosuvastatin to 20 since LDL 96\par - Lasix 40 p.o.  maintain low salt diet, elevate legs when nonambulatory, knee-high compression stockings during the day and with prolonged travel. exercise.\par \par #PVCs improved from 6% to 0.4% with improved volume status\par \par # HTN better controlled: high Na\par - continue losartan 50 bid. \par - lasix 40. bp log. low Na diet.\par \par # PAD (mod b/l carotid stenosis): stable. recheck in 6 months\par -Continue rosuvastatin 20\par -Antiplatelets\par \par # Obesity: \par - have ordered home sleep study and strongly encouraged getting again\par \par Follow 6 months with labwork. \par

## 2022-10-12 NOTE — DISCUSSION/SUMMARY
[FreeTextEntry1] : \par # CAD with PCI proximal RCA 4/2022, HFpEF, now has resolved DEL CID:\par -Continue DAPT for at least 6 months.  Then will consider Plavix monotherapy.  Ordered cardiac rehab.\par -Increase rosuvastatin to 10\par -Lasix 20 p.o.  maintain low salt diet, elevate legs when nonambulatory, knee-high compression stockings during the day and with prolonged travel.\par \par # Palpitations with PVCs:\par -24-hour monitor to determine burden.  EKG otherwise unchanged.  Metoprolol 25 to start after 24 hours.\par -Ordered CMP/magnesium\par \par # HTN fairly well controlled:\par - continue losartan 50. lasix 20. low Na diet.  Added metoprolol 25.\par \par # PAD (mod b/l carotid stenosis): stable. recheck in 6 months due 10/2022\par -Continue rosuvastatin 10\par -Antiplatelets\par \par # Obesity: \par - have ordered home sleep study\par \par \par Follow up in 3 months. ER precautions given to patient.\par \par  Purse String (Intermediate) Text: Given the location of the defect and the characteristics of the surrounding skin a purse string intermediate closure was deemed most appropriate.  Undermining was performed circumferentially around the surgical defect.  A purse string suture was then placed and tightened.

## 2022-11-04 ENCOUNTER — APPOINTMENT (OUTPATIENT)
Dept: OPHTHALMOLOGY | Facility: CLINIC | Age: 82
End: 2022-11-04

## 2022-11-11 ENCOUNTER — NON-APPOINTMENT (OUTPATIENT)
Age: 82
End: 2022-11-11

## 2022-11-11 ENCOUNTER — APPOINTMENT (OUTPATIENT)
Dept: OPHTHALMOLOGY | Facility: CLINIC | Age: 82
End: 2022-11-11

## 2022-11-11 PROCEDURE — 92014 COMPRE OPH EXAM EST PT 1/>: CPT

## 2022-12-16 ENCOUNTER — NON-APPOINTMENT (OUTPATIENT)
Age: 82
End: 2022-12-16

## 2022-12-19 ENCOUNTER — APPOINTMENT (OUTPATIENT)
Dept: CARDIOLOGY | Facility: CLINIC | Age: 82
End: 2022-12-19

## 2022-12-30 ENCOUNTER — NON-APPOINTMENT (OUTPATIENT)
Age: 82
End: 2022-12-30

## 2022-12-30 ENCOUNTER — APPOINTMENT (OUTPATIENT)
Dept: OPHTHALMOLOGY | Facility: CLINIC | Age: 82
End: 2022-12-30
Payer: MEDICARE

## 2022-12-30 PROCEDURE — 92136 OPHTHALMIC BIOMETRY: CPT

## 2022-12-30 PROCEDURE — 99213 OFFICE O/P EST LOW 20 MIN: CPT

## 2023-01-10 ENCOUNTER — NON-APPOINTMENT (OUTPATIENT)
Age: 83
End: 2023-01-10

## 2023-01-11 ENCOUNTER — APPOINTMENT (OUTPATIENT)
Dept: OPHTHALMOLOGY | Facility: AMBULATORY MEDICAL SERVICES | Age: 83
End: 2023-01-11
Payer: MEDICARE

## 2023-01-11 PROCEDURE — 66984 XCAPSL CTRC RMVL W/O ECP: CPT | Mod: 79,RT

## 2023-01-12 ENCOUNTER — APPOINTMENT (OUTPATIENT)
Dept: OPHTHALMOLOGY | Facility: CLINIC | Age: 83
End: 2023-01-12
Payer: MEDICARE

## 2023-01-12 ENCOUNTER — NON-APPOINTMENT (OUTPATIENT)
Age: 83
End: 2023-01-12

## 2023-01-12 PROCEDURE — 99024 POSTOP FOLLOW-UP VISIT: CPT

## 2023-01-13 ENCOUNTER — APPOINTMENT (OUTPATIENT)
Dept: OPHTHALMOLOGY | Facility: CLINIC | Age: 83
End: 2023-01-13
Payer: MEDICARE

## 2023-01-13 ENCOUNTER — NON-APPOINTMENT (OUTPATIENT)
Age: 83
End: 2023-01-13

## 2023-01-13 PROCEDURE — 99024 POSTOP FOLLOW-UP VISIT: CPT

## 2023-01-20 ENCOUNTER — APPOINTMENT (OUTPATIENT)
Dept: OPHTHALMOLOGY | Facility: CLINIC | Age: 83
End: 2023-01-20
Payer: MEDICARE

## 2023-01-20 ENCOUNTER — NON-APPOINTMENT (OUTPATIENT)
Age: 83
End: 2023-01-20

## 2023-01-20 PROCEDURE — 99024 POSTOP FOLLOW-UP VISIT: CPT

## 2023-02-02 ENCOUNTER — NON-APPOINTMENT (OUTPATIENT)
Age: 83
End: 2023-02-02

## 2023-02-03 ENCOUNTER — APPOINTMENT (OUTPATIENT)
Dept: CARDIOLOGY | Facility: CLINIC | Age: 83
End: 2023-02-03
Payer: MEDICARE

## 2023-02-03 ENCOUNTER — NON-APPOINTMENT (OUTPATIENT)
Age: 83
End: 2023-02-03

## 2023-02-03 ENCOUNTER — APPOINTMENT (OUTPATIENT)
Dept: OPHTHALMOLOGY | Facility: CLINIC | Age: 83
End: 2023-02-03
Payer: MEDICARE

## 2023-02-03 VITALS
OXYGEN SATURATION: 97 % | HEART RATE: 76 BPM | DIASTOLIC BLOOD PRESSURE: 60 MMHG | WEIGHT: 227 LBS | HEIGHT: 63 IN | BODY MASS INDEX: 40.22 KG/M2 | TEMPERATURE: 97.6 F | SYSTOLIC BLOOD PRESSURE: 166 MMHG

## 2023-02-03 PROCEDURE — 99024 POSTOP FOLLOW-UP VISIT: CPT

## 2023-02-03 PROCEDURE — 99215 OFFICE O/P EST HI 40 MIN: CPT

## 2023-02-03 RX ORDER — ASPIRIN 81 MG/1
81 TABLET ORAL
Qty: 90 | Refills: 3 | Status: ACTIVE | COMMUNITY
Start: 2022-04-04

## 2023-02-03 NOTE — PHYSICAL EXAM
[General Appearance - Well Developed] : well developed [Normal Appearance] : normal appearance [Well Groomed] : well groomed [General Appearance - Well Nourished] : well nourished [No Deformities] : no deformities [General Appearance - In No Acute Distress] : no acute distress [Normal Conjunctiva] : the conjunctiva exhibited no abnormalities [Eyelids - No Xanthelasma] : the eyelids demonstrated no xanthelasmas [Normal Oral Mucosa] : normal oral mucosa [No Oral Pallor] : no oral pallor [No Oral Cyanosis] : no oral cyanosis [No Jugular Venous Garcia A Waves] : no jugular venous garcia A waves [Heart Rate And Rhythm] : heart rate and rhythm were normal [Respiration, Rhythm And Depth] : normal respiratory rhythm and effort [Auscultation Breath Sounds / Voice Sounds] : lungs were clear to auscultation bilaterally [Abdomen Soft] : soft [Abdomen Tenderness] : non-tender [Nail Clubbing] : no clubbing of the fingernails [Cyanosis, Localized] : no localized cyanosis [Skin Color & Pigmentation] : normal skin color and pigmentation [] : no rash [No Venous Stasis] : no venous stasis [Skin Lesions] : no skin lesions [No Skin Ulcers] : no skin ulcer [No Xanthoma] : no  xanthoma was observed [Oriented To Time, Place, And Person] : oriented to person, place, and time [Affect] : the affect was normal [Mood] : the mood was normal [No Anxiety] : not feeling anxious [JVD Elevated _____cm] : JVD elevated [unfilled] ~U cm above clavicle [FreeTextEntry1] : Trace edema b/l

## 2023-02-03 NOTE — DISCUSSION/SUMMARY
[FreeTextEntry1] : \par # Dyspnea with decompensated HFpEF: also contributions from potential CHAU, uncontrolled BP, valvular, deconditioning \par - add chlorthalidone 25 (Could need 50)\par - lower Na intake, keep lasix 40 PO. elevate legs when nonambulatory, knee-high compression stockings during the day and with prolonged travel. exercise.\par - sleep study, TTE\par - if doesn't improve and no other discernible etiology, then consider LHC/RHC\par \par # CAD with PCI proximal RCA 4/2022: \par - aspirin/plavix for now, can consider monotherapy in near future. \par - ordered cardiac rehab again \par - rosuvastatin 20 \par \par #PVCs improved from 6% to 0.4% \par \par # HTN uncontrolled: high Na\par - add chlorthalidone 25 \par - continue losartan 50 bid. \par - lasix 40. bp log. low Na diet.\par \par # PAD (mod b/l carotid stenosis): stable. \par - recheck \par -Continue rosuvastatin 20\par -Antiplatelets\par \par # Obesity: \par - have ordered home sleep study and strongly encouraged getting again\par \par \par Follow after testing.  ER precautions given to patient.\par \par

## 2023-02-23 ENCOUNTER — APPOINTMENT (OUTPATIENT)
Dept: CARDIOLOGY | Facility: CLINIC | Age: 83
End: 2023-02-23
Payer: MEDICARE

## 2023-02-23 PROCEDURE — 93880 EXTRACRANIAL BILAT STUDY: CPT

## 2023-02-23 PROCEDURE — 93306 TTE W/DOPPLER COMPLETE: CPT

## 2023-02-24 ENCOUNTER — NON-APPOINTMENT (OUTPATIENT)
Age: 83
End: 2023-02-24

## 2023-03-02 ENCOUNTER — NON-APPOINTMENT (OUTPATIENT)
Age: 83
End: 2023-03-02

## 2023-03-13 ENCOUNTER — APPOINTMENT (OUTPATIENT)
Dept: ORTHOPEDIC SURGERY | Facility: CLINIC | Age: 83
End: 2023-03-13

## 2023-03-15 ENCOUNTER — APPOINTMENT (OUTPATIENT)
Dept: CARDIOLOGY | Facility: CLINIC | Age: 83
End: 2023-03-15

## 2023-03-16 ENCOUNTER — APPOINTMENT (OUTPATIENT)
Dept: UROGYNECOLOGY | Facility: CLINIC | Age: 83
End: 2023-03-16

## 2023-04-04 ENCOUNTER — NON-APPOINTMENT (OUTPATIENT)
Age: 83
End: 2023-04-04

## 2023-04-05 ENCOUNTER — NON-APPOINTMENT (OUTPATIENT)
Age: 83
End: 2023-04-05

## 2023-04-11 ENCOUNTER — NON-APPOINTMENT (OUTPATIENT)
Age: 83
End: 2023-04-11

## 2023-04-12 ENCOUNTER — NON-APPOINTMENT (OUTPATIENT)
Age: 83
End: 2023-04-12

## 2023-04-12 DIAGNOSIS — R10.9 UNSPECIFIED ABDOMINAL PAIN: ICD-10-CM

## 2023-04-13 ENCOUNTER — NON-APPOINTMENT (OUTPATIENT)
Age: 83
End: 2023-04-13

## 2023-04-17 ENCOUNTER — NON-APPOINTMENT (OUTPATIENT)
Age: 83
End: 2023-04-17

## 2023-05-10 ENCOUNTER — APPOINTMENT (OUTPATIENT)
Dept: CARDIOLOGY | Facility: CLINIC | Age: 83
End: 2023-05-10
Payer: MEDICARE

## 2023-05-10 ENCOUNTER — NON-APPOINTMENT (OUTPATIENT)
Age: 83
End: 2023-05-10

## 2023-05-10 VITALS
SYSTOLIC BLOOD PRESSURE: 130 MMHG | DIASTOLIC BLOOD PRESSURE: 64 MMHG | HEART RATE: 80 BPM | BODY MASS INDEX: 40.22 KG/M2 | OXYGEN SATURATION: 98 % | HEIGHT: 63 IN | WEIGHT: 227 LBS

## 2023-05-10 PROCEDURE — 93000 ELECTROCARDIOGRAM COMPLETE: CPT

## 2023-05-10 PROCEDURE — 99215 OFFICE O/P EST HI 40 MIN: CPT

## 2023-05-10 NOTE — HISTORY OF PRESENT ILLNESS
[FreeTextEntry1] : \par 82 yo F with CAD with PCI proximal RCA 4/2022 and POBA D2, moderate carotid stenosis, HTN, HLD, obesity, former smoker, dm2, CKD here for follow up. She is a LPN. \par \par 5/2023: dyspnea on exertion, at rest is fine. did not improve after chlorthalidone 25. still hesitant for home sleep study. echo and cxr unrevealing. doing cardiac rehab again limited on treadmill. \par interim: cxr unremarkable\par moderate bilateral carotid disease \par TTE with preserved bivent function\par \par 2/2023 VISIT: still with dyspnea on exertion and she reports pulmonary work up unremarkable. worsens in cold weather. completed cardiac rehab. no angina. \par \par 8/2022 VISIT: no metoprolol.  No angina.  PVCs improved with improved volume status.  Doing cardiac rehab.\par \par 5/2022 VISIT: 6.3% PVCs, did not start metoprolol yet. no angina. still with dyspnea and palpitations correlating with pvcs. no nsvt noted.\par \par 4/22/22 VISIT: Underwent PCI.  Right radial site well-healing.  Patent neurovascular status.  Testing as detailed below.  Creatinine improved to 1.0.  Her dyspnea has much improved/resolved.  She does now have palpitations appears to correlate with PVCs today.  No angina.  Not taking Lasix as prescribed.  Did increase her rosuvastatin.\par \par 4/4/2022: interim testing. tte and carotid, discussion regarding ischemia evaluation testing she declined stress test. reports dyspnea unchanged. weight loss. CXR small airway disease. \par \par 3/2022: [facility] ER for DEL CID. DEL CID worsening over past 2 weeks with cough. Baseline LE edema. No angina. had abnormal tread nst 2/2020.\par \par 2020: "dyspnea with moderate exertion.  Patient has dependent leg edema on Lasix as needed.  patient has recurrent palpitations described as a fluttering no associated symptoms."\par \par \par \par TESTING: \par 1/2023 LABWORK: LDL 61. normal cbc/cmp. trig 151. a1c 6.5. cr 1.05. \par \par 8/2022: LDL 96. cr 1.23. trig 168. a1c 6.. \par 24-hour monitor:0.4% pvc. \par \par 6/2022 labwork: cr 1.25. trig 180. LDL 87. \par \par 4/2022:\par 24 hour HOLTER: 6.3% PVCs no nsvt.\par CORONARY ANGIOGRAM: IVUS guided PCI proximal RCA 2.75 x 18 [place] (postdilated 3.5).  Mildly elevated LVEDP.  D2 50%.\par EKG: NSR with PVCs.\par \par 3/2022:\par LABWORK: Cr 1.22. normal lft. a1c 6.3. normal tft. hgb 12.5. . trig 168. \par CAROTID DUPLEX: moderate bilateral\par TTE: preserved biventricular function. hyperdynamic. normal rv. \par LABWORK: Cr 1.19. BUN 23. normal LFT. NTBNP 85. normal cbc.\par EKG: q wave anterior\par CXR: mild peribronchial thickening \par \par 2/2020:\par TREADMILL NUCLEAR STRESS TEST: 7 METS ischemic ekg and mild infarction anterior region\par CAROTID DUPLEX: moderate bilateral\par TTE: grossly unremarkable. G1DD. \par no AAA\par

## 2023-05-10 NOTE — PHYSICAL EXAM
[General Appearance - Well Developed] : well developed [Normal Appearance] : normal appearance [Well Groomed] : well groomed [General Appearance - Well Nourished] : well nourished [No Deformities] : no deformities [General Appearance - In No Acute Distress] : no acute distress [Normal Conjunctiva] : the conjunctiva exhibited no abnormalities [Eyelids - No Xanthelasma] : the eyelids demonstrated no xanthelasmas [Normal Oral Mucosa] : normal oral mucosa [No Oral Pallor] : no oral pallor [No Oral Cyanosis] : no oral cyanosis [JVD Elevated _____cm] : JVD elevated [unfilled] ~U cm above clavicle [No Jugular Venous Garcia A Waves] : no jugular venous garcia A waves [Heart Rate And Rhythm] : heart rate and rhythm were normal [Respiration, Rhythm And Depth] : normal respiratory rhythm and effort [Auscultation Breath Sounds / Voice Sounds] : lungs were clear to auscultation bilaterally [Abdomen Soft] : soft [Abdomen Tenderness] : non-tender [Nail Clubbing] : no clubbing of the fingernails [Cyanosis, Localized] : no localized cyanosis [Skin Color & Pigmentation] : normal skin color and pigmentation [] : no rash [No Venous Stasis] : no venous stasis [Skin Lesions] : no skin lesions [No Skin Ulcers] : no skin ulcer [No Xanthoma] : no  xanthoma was observed [Oriented To Time, Place, And Person] : oriented to person, place, and time [Affect] : the affect was normal [Mood] : the mood was normal [No Anxiety] : not feeling anxious [FreeTextEntry1] : Trace edema b/l

## 2023-05-10 NOTE — DISCUSSION/SUMMARY
[FreeTextEntry1] : 84 yo F with CAD with PCI proximal RCA 4/2022 and POBA D2, moderate carotid stenosis, HTN, HLD, obesity, former smoker, dm2, CKD here for follow up.\par \par dyspnea on exertion. did not improve after chlorthalidone 25. still hesitant for home sleep study. echo and cxr unrevealing. moderate bilateral carotid disease. TTE with preserved bivent function. \par \par # Dyspnea with decompensated HFpEF: also contributions from potential CHAU, uncontrolled BP, valvular, deconditioning \par - chlorthalidone increase to 50\par - lower Na intake, keep lasix 40 PO. elevate legs when nonambulatory, knee-high compression stockings during the day and with prolonged travel. exercise.\par - she continues to be hesitant for home sleep study\par - as all is unrevealing, recommend LHC/RHC \par \par # CAD with PCI proximal RCA 4/2022: \par - aspirin/plavix for now, can consider monotherapy in near future. \par - doing cardiac rehab again \par - rosuvastatin 20 \par \par #PVCs improved from 6% to 0.4% \par \par # HTN: high Na intake \par - chlorthalidone to 50 \par - continue losartan 50 bid. \par - lasix 40. bp log. low Na diet.\par \par # PAD (mod b/l carotid stenosis): stable 5/2023. \par - yearly \par - rosuvastatin 20\par -Antiplatelets\par \par # Obesity: \par - have ordered home sleep study and strongly encouraged getting again\par \par Follow after testing. ER precautions given to patient.\par

## 2023-05-11 ENCOUNTER — NON-APPOINTMENT (OUTPATIENT)
Age: 83
End: 2023-05-11

## 2023-05-11 DIAGNOSIS — I49.9 CARDIAC ARRHYTHMIA, UNSPECIFIED: ICD-10-CM

## 2023-05-12 ENCOUNTER — NON-APPOINTMENT (OUTPATIENT)
Age: 83
End: 2023-05-12

## 2023-06-23 ENCOUNTER — NON-APPOINTMENT (OUTPATIENT)
Age: 83
End: 2023-06-23

## 2023-07-15 ENCOUNTER — NON-APPOINTMENT (OUTPATIENT)
Age: 83
End: 2023-07-15

## 2023-10-10 ENCOUNTER — NON-APPOINTMENT (OUTPATIENT)
Age: 83
End: 2023-10-10

## 2023-11-01 ENCOUNTER — APPOINTMENT (OUTPATIENT)
Dept: CARDIOLOGY | Facility: CLINIC | Age: 83
End: 2023-11-01
Payer: MEDICARE

## 2023-11-01 VITALS
SYSTOLIC BLOOD PRESSURE: 118 MMHG | HEIGHT: 63 IN | DIASTOLIC BLOOD PRESSURE: 64 MMHG | OXYGEN SATURATION: 96 % | WEIGHT: 223 LBS | BODY MASS INDEX: 39.51 KG/M2 | HEART RATE: 85 BPM

## 2023-11-01 DIAGNOSIS — R73.03 PREDIABETES.: ICD-10-CM

## 2023-11-01 DIAGNOSIS — K56.609 UNSPECIFIED INTESTINAL OBSTRUCTION, UNSPECIFIED AS TO PARTIAL VERSUS COMPLETE OBSTRUCTION: ICD-10-CM

## 2023-11-01 DIAGNOSIS — I65.23 OCCLUSION AND STENOSIS OF BILATERAL CAROTID ARTERIES: ICD-10-CM

## 2023-11-01 PROCEDURE — 99215 OFFICE O/P EST HI 40 MIN: CPT

## 2023-11-01 RX ORDER — CHLORTHALIDONE 50 MG/1
50 TABLET ORAL DAILY
Qty: 90 | Refills: 1 | Status: DISCONTINUED | COMMUNITY
Start: 2023-02-03 | End: 2023-11-01

## 2023-11-16 ENCOUNTER — NON-APPOINTMENT (OUTPATIENT)
Age: 83
End: 2023-11-16

## 2024-05-03 ENCOUNTER — NON-APPOINTMENT (OUTPATIENT)
Age: 84
End: 2024-05-03

## 2024-05-04 ENCOUNTER — NON-APPOINTMENT (OUTPATIENT)
Age: 84
End: 2024-05-04

## 2024-05-04 RX ORDER — CLOPIDOGREL BISULFATE 75 MG/1
75 TABLET, FILM COATED ORAL
Qty: 90 | Refills: 3 | Status: ACTIVE | COMMUNITY
Start: 2023-02-25 | End: 1900-01-01

## 2024-05-07 ENCOUNTER — NON-APPOINTMENT (OUTPATIENT)
Age: 84
End: 2024-05-07

## 2024-05-07 PROBLEM — I49.3 PVC (PREMATURE VENTRICULAR CONTRACTION): Status: ACTIVE | Noted: 2022-05-11

## 2024-05-08 ENCOUNTER — APPOINTMENT (OUTPATIENT)
Dept: CARDIOLOGY | Facility: CLINIC | Age: 84
End: 2024-05-08
Payer: MEDICARE

## 2024-05-08 VITALS
BODY MASS INDEX: 39.69 KG/M2 | HEIGHT: 63 IN | SYSTOLIC BLOOD PRESSURE: 134 MMHG | OXYGEN SATURATION: 95 % | DIASTOLIC BLOOD PRESSURE: 70 MMHG | WEIGHT: 224 LBS | HEART RATE: 92 BPM

## 2024-05-08 DIAGNOSIS — I49.3 VENTRICULAR PREMATURE DEPOLARIZATION: ICD-10-CM

## 2024-05-08 PROCEDURE — 93880 EXTRACRANIAL BILAT STUDY: CPT

## 2024-05-08 PROCEDURE — 99215 OFFICE O/P EST HI 40 MIN: CPT

## 2024-05-08 RX ORDER — FUROSEMIDE 40 MG/1
40 TABLET ORAL DAILY
Qty: 90 | Refills: 3 | Status: DISCONTINUED | COMMUNITY
Start: 2022-03-09 | End: 2024-05-08

## 2024-05-08 NOTE — DISCUSSION/SUMMARY
[FreeTextEntry1] : 85 yo F with CAD with PCI proximal RCA 4/2022 and POBA D2, moderate carotid stenosis, HTN, obesity, former smoker, dm2, CKD here for follow up.  She has intermittent dyspnea that is multifactorial with large component of HFpEF. Other contributors could be potential CHAU/HTN/deconditioning. She did not take spironolactone. **We will do entresto and spironolactone now to optimize HFpEF.   She has preserved biventricular function. She is finally amenable to home sleep study!   I think we should do a LHC/RHC at some point.  We should maintain aspirin/plavix for now, can consider monotherapy in near future. rosuvastatin 20   Obtain yearly carotid duplex.  Follow after testing. ER precautions given to patient.

## 2024-05-08 NOTE — REASON FOR VISIT
[Symptom and Test Evaluation] : symptom and test evaluation [CV Risk Factors and Non-Cardiac Disease] : CV risk factors and non-cardiac disease [Coronary Artery Disease] : coronary artery disease [Follow-Up - Clinic] : a clinic follow-up of [Abnormal Test Result] : an abnormal test result [Dyspnea] : dyspnea [Medication Management] : Medication management [Peripheral Vascular Disease] : peripheral vascular disease [FreeTextEntry3] : Dr. Alvina Chilel

## 2024-05-08 NOTE — HISTORY OF PRESENT ILLNESS
[FreeTextEntry1] : 83 yo F with CAD with PCI proximal RCA 4/2022 and POBA D2, moderate carotid stenosis, HTN, obesity, former smoker, dm2, CKD here for follow up. She is a LPN.   5/2024 VISIT: still dyspneic, did not take camille concern for more urination. no angina. carotid duplex unchanged.   11/2023 VISIT: interim had small bowel obstruction. completed cardiac rehab. she hasn't had any more dyspnea. feels much stronger. gym.   5/2023: dyspnea on exertion, at rest is fine. did not improve after chlorthalidone 25. still hesitant for home sleep study. echo and cxr unrevealing. doing cardiac rehab again limited on treadmill.  interim: cxr unremarkable moderate bilateral carotid disease  TTE with preserved bivent function  2/2023 VISIT: still with dyspnea on exertion and she reports pulmonary work up unremarkable. worsens in cold weather. completed cardiac rehab. no angina.   8/2022 VISIT: no metoprolol.  No angina.  PVCs improved with improved volume status.  Doing cardiac rehab.  5/2022 VISIT: 6.3% PVCs, did not start metoprolol yet. no angina. still with dyspnea and palpitations correlating with pvcs. no nsvt noted.  4/22/22 VISIT: Underwent PCI.  Right radial site well-healing.  Patent neurovascular status.  Testing as detailed below.  Creatinine improved to 1.0.  Her dyspnea has much improved/resolved.  She does now have palpitations appears to correlate with PVCs today.  No angina.  Not taking Lasix as prescribed.  Did increase her rosuvastatin.   4/4/2022: interim testing. tte and carotid, discussion regarding ischemia evaluation testing she declined stress test. reports dyspnea unchanged. weight loss. CXR small airway disease.   3/2022: [facility] ER for DEL CID. DEL CID worsening over past 2 weeks with cough. Baseline LE edema. No angina. had abnormal tread nst 2/2020.  2020: "dyspnea with moderate exertion.  Patient has dependent leg edema on Lasix as needed.  patient has recurrent palpitations described as a fluttering no associated symptoms."  **TESTING: excluding above  1/2023 LABWORK: LDL 61. normal cbc/cmp. trig 151. a1c 6.5. cr 1.05.   8/2022: LDL 96. cr 1.23. trig 168. a1c 6..  24-hour monitor:0.4% pvc.   6/2022 labwork: cr 1.25. trig 180. LDL 87.   4/2022: 24 hour HOLTER: 6.3% PVCs no nsvt. CORONARY ANGIOGRAM: IVUS guided PCI proximal RCA 2.75 x 18 [place] (postdilated 3.5).  Mildly elevated LVEDP.  D2 50%. EKG: NSR with PVCs.  3/2022: LABWORK: Cr 1.22. normal lft. a1c 6.3. normal tft. hgb 12.5. . trig 168.  CAROTID DUPLEX: moderate bilateral TTE: preserved biventricular function. hyperdynamic. normal rv.  LABWORK: Cr 1.19. BUN 23. normal LFT. NTBNP 85. normal cbc. EKG: q wave anterior CXR: mild peribronchial thickening   2/2020: TREADMILL NUCLEAR STRESS TEST: 7 METS ischemic ekg and mild infarction anterior region CAROTID DUPLEX: moderate bilateral TTE: grossly unremarkable. G1DD.  no AAA

## 2024-05-09 DIAGNOSIS — R06.09 OTHER FORMS OF DYSPNEA: ICD-10-CM

## 2024-05-09 RX ORDER — LOSARTAN POTASSIUM 50 MG/1
50 TABLET, FILM COATED ORAL
Qty: 180 | Refills: 2 | Status: ACTIVE | COMMUNITY
Start: 2024-05-09 | End: 1900-01-01

## 2024-05-30 ENCOUNTER — NON-APPOINTMENT (OUTPATIENT)
Age: 84
End: 2024-05-30

## 2024-05-30 ENCOUNTER — OUTPATIENT (OUTPATIENT)
Dept: OUTPATIENT SERVICES | Facility: HOSPITAL | Age: 84
LOS: 1 days | End: 2024-05-30
Payer: MEDICARE

## 2024-05-30 DIAGNOSIS — G47.33 OBSTRUCTIVE SLEEP APNEA (ADULT) (PEDIATRIC): ICD-10-CM

## 2024-05-30 PROCEDURE — 95800 SLP STDY UNATTENDED: CPT | Mod: 26

## 2024-05-30 PROCEDURE — 95800 SLP STDY UNATTENDED: CPT

## 2024-05-31 RX ORDER — ALIROCUMAB 75 MG/ML
75 INJECTION, SOLUTION SUBCUTANEOUS
Qty: 6 | Refills: 3 | Status: ACTIVE | COMMUNITY
Start: 2024-05-31 | End: 1900-01-01

## 2024-06-04 ENCOUNTER — NON-APPOINTMENT (OUTPATIENT)
Age: 84
End: 2024-06-04

## 2024-06-12 ENCOUNTER — APPOINTMENT (OUTPATIENT)
Dept: CARDIOLOGY | Facility: CLINIC | Age: 84
End: 2024-06-12
Payer: MEDICARE

## 2024-06-12 VITALS
SYSTOLIC BLOOD PRESSURE: 110 MMHG | DIASTOLIC BLOOD PRESSURE: 60 MMHG | WEIGHT: 231 LBS | HEIGHT: 63 IN | HEART RATE: 95 BPM | OXYGEN SATURATION: 95 % | BODY MASS INDEX: 40.93 KG/M2

## 2024-06-12 DIAGNOSIS — I10 ESSENTIAL (PRIMARY) HYPERTENSION: ICD-10-CM

## 2024-06-12 DIAGNOSIS — I25.10 ATHEROSCLEROTIC HEART DISEASE OF NATIVE CORONARY ARTERY W/OUT ANGINA PECTORIS: ICD-10-CM

## 2024-06-12 DIAGNOSIS — R00.2 PALPITATIONS: ICD-10-CM

## 2024-06-12 DIAGNOSIS — N28.9 DISORDER OF KIDNEY AND URETER, UNSPECIFIED: ICD-10-CM

## 2024-06-12 DIAGNOSIS — E78.2 MIXED HYPERLIPIDEMIA: ICD-10-CM

## 2024-06-12 DIAGNOSIS — E66.9 OBESITY, UNSPECIFIED: ICD-10-CM

## 2024-06-12 DIAGNOSIS — G47.33 OBSTRUCTIVE SLEEP APNEA (ADULT) (PEDIATRIC): ICD-10-CM

## 2024-06-12 PROCEDURE — 99215 OFFICE O/P EST HI 40 MIN: CPT

## 2024-06-12 RX ORDER — FUROSEMIDE 40 MG/1
40 TABLET ORAL
Qty: 90 | Refills: 1 | Status: ACTIVE | COMMUNITY
Start: 2024-06-12 | End: 1900-01-01

## 2024-06-12 RX ORDER — ROSUVASTATIN CALCIUM 20 MG/1
20 TABLET, FILM COATED ORAL
Qty: 90 | Refills: 3 | Status: DISCONTINUED | COMMUNITY
Start: 2023-06-24 | End: 2024-06-12

## 2024-06-12 NOTE — END OF VISIT
[Time Spent: ___ minutes] : I have spent [unfilled] minutes of time on the encounter.
no chest pain/no palpitations/no dyspnea on exertion

## 2024-06-12 NOTE — HISTORY OF PRESENT ILLNESS
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   6/2024 visit: Not taking ARNI or MRA nor statin.  Reports symptoms are much better now. Discussed PCSK9 inhibitor cost whether it is deductible or not. Mild CHAU on sleep study  5/2024 VISIT: still dyspneic, did not take camille concern for more urination. no angina. carotid duplex unchanged.   11/2023 VISIT: interim had small bowel obstruction. completed cardiac rehab. she hasn't had any more dyspnea. feels much stronger. gym.   5/2023: dyspnea on exertion, at rest is fine. did not improve after chlorthalidone 25. still hesitant for home sleep study. echo and cxr unrevealing. doing cardiac rehab again limited on treadmill.  interim: cxr unremarkable moderate bilateral carotid disease  TTE with preserved bivent function  2/2023 VISIT: still with dyspnea on exertion and she reports pulmonary work up unremarkable. worsens in cold weather. completed cardiac rehab. no angina.   8/2022 VISIT: no metoprolol.  No angina.  PVCs improved with improved volume status.  Doing cardiac rehab.  5/2022 VISIT: 6.3% PVCs, did not start metoprolol yet. no angina. still with dyspnea and palpitations correlating with pvcs. no nsvt noted.  4/22/22 VISIT: Underwent PCI.  Right radial site well-healing.  Patent neurovascular status.  Testing as detailed below.  Creatinine improved to 1.0.  Her dyspnea has much improved/resolved.  She does now have palpitations appears to correlate with PVCs today.  No angina.  Not taking Lasix as prescribed.  Did increase her rosuvastatin.   4/4/2022: interim testing. tte and carotid, discussion regarding ischemia evaluation testing she declined stress test. reports dyspnea unchanged. weight loss. CXR small airway disease.   3/2022: [facility] ER for DEL CID. DEL CID worsening over past 2 weeks with cough. Baseline LE edema. No angina. had abnormal tread nst 2/2020.  2020: "dyspnea with moderate exertion.  Patient has dependent leg edema on Lasix as needed.  patient has recurrent palpitations described as a fluttering no associated symptoms."  **TESTING: excluding above  1/2023 LABWORK: LDL 61. normal cbc/cmp. trig 151. a1c 6.5. cr 1.05.   8/2022: LDL 96. cr 1.23. trig 168. a1c 6..  24-hour monitor:0.4% pvc.   6/2022 labwork: cr 1.25. trig 180. LDL 87.   4/2022: 24 hour HOLTER: 6.3% PVCs no nsvt. CORONARY ANGIOGRAM: IVUS guided PCI proximal RCA 2.75 x 18 [place] (postdilated 3.5).  Mildly elevated LVEDP.  D2 50%. EKG: NSR with PVCs.  3/2022: LABWORK: Cr 1.22. normal lft. a1c 6.3. normal tft. hgb 12.5. . trig 168.  CAROTID DUPLEX: moderate bilateral TTE: preserved biventricular function. hyperdynamic. normal rv.  LABWORK: Cr 1.19. BUN 23. normal LFT. NTBNP 85. normal cbc. EKG: q wave anterior CXR: mild peribronchial thickening   2/2020: TREADMILL NUCLEAR STRESS TEST: 7 METS ischemic ekg and mild infarction anterior region CAROTID DUPLEX: moderate bilateral TTE: grossly unremarkable. G1DD.  no AAA

## 2024-06-12 NOTE — DISCUSSION/SUMMARY
[FreeTextEntry1] : 83 yo F with CAD with PCI proximal RCA 4/2022 and POBA D2, moderate carotid stenosis, HTN, obesity mild CHAU, former smoker, dm2, CKD here for follow up.  Apparently, she feels much better off of all these meds including statin. I am not sure why. Her dyspnea I'm almost certain her intermittent dyspnea that is multifactorial with large component of HFpEF. Other contributors could be potential CHAU/HTN/deconditioning. She is not taking any of the meds recommended for HFpEF.  I would consider seeing pulmonary sleep to perform in-house sleep study as I am concerned the home sleep study underestimated the severity of her sleep apnea.  She has preserved biventricular function. I think we should do a LHC/RHC at some point she is hesitant.  We should maintain aspirin/plavix for now, can consider monotherapy in near future.  She reports feeling great off of statin therapy.  Therefore I have started on PCSK9 inhibitor hopefully it is covered but unfortunately she does not want to do it because of "muscle weakness concern."  Obtain yearly carotid duplex due May 2025.  Lab work and echocardiogram in 2 months follow-up. ER precautions given to patient.

## 2024-06-16 ENCOUNTER — NON-APPOINTMENT (OUTPATIENT)
Age: 84
End: 2024-06-16

## 2024-06-19 ENCOUNTER — APPOINTMENT (OUTPATIENT)
Dept: ORTHOPEDIC SURGERY | Facility: CLINIC | Age: 84
End: 2024-06-19

## 2024-07-05 ENCOUNTER — APPOINTMENT (OUTPATIENT)
Dept: CARDIOLOGY | Facility: CLINIC | Age: 84
End: 2024-07-05
Payer: MEDICARE

## 2024-07-05 VITALS
RESPIRATION RATE: 14 BRPM | OXYGEN SATURATION: 95 % | HEART RATE: 74 BPM | BODY MASS INDEX: 40.75 KG/M2 | DIASTOLIC BLOOD PRESSURE: 74 MMHG | HEIGHT: 63 IN | SYSTOLIC BLOOD PRESSURE: 126 MMHG | WEIGHT: 230 LBS

## 2024-07-05 DIAGNOSIS — E11.49 TYPE 2 DIABETES MELLITUS WITH OTHER DIABETIC NEUROLOGICAL COMPLICATION: ICD-10-CM

## 2024-07-05 DIAGNOSIS — I25.10 ATHEROSCLEROTIC HEART DISEASE OF NATIVE CORONARY ARTERY W/OUT ANGINA PECTORIS: ICD-10-CM

## 2024-07-05 DIAGNOSIS — G47.33 OBSTRUCTIVE SLEEP APNEA (ADULT) (PEDIATRIC): ICD-10-CM

## 2024-07-05 DIAGNOSIS — I10 ESSENTIAL (PRIMARY) HYPERTENSION: ICD-10-CM

## 2024-07-05 DIAGNOSIS — E66.9 OBESITY, UNSPECIFIED: ICD-10-CM

## 2024-07-05 DIAGNOSIS — E78.2 MIXED HYPERLIPIDEMIA: ICD-10-CM

## 2024-07-05 PROCEDURE — 99214 OFFICE O/P EST MOD 30 MIN: CPT

## 2024-07-12 RX ORDER — SEMAGLUTIDE 0.68 MG/ML
2 INJECTION, SOLUTION SUBCUTANEOUS
Qty: 1 | Refills: 1 | Status: ACTIVE | COMMUNITY
Start: 2024-07-05

## 2024-07-18 ENCOUNTER — APPOINTMENT (OUTPATIENT)
Dept: CARDIOLOGY | Facility: CLINIC | Age: 84
End: 2024-07-18

## 2024-08-26 ENCOUNTER — APPOINTMENT (OUTPATIENT)
Dept: CARDIOLOGY | Facility: CLINIC | Age: 84
End: 2024-08-26

## 2024-09-16 ENCOUNTER — APPOINTMENT (OUTPATIENT)
Dept: CARDIOLOGY | Facility: CLINIC | Age: 84
End: 2024-09-16

## 2024-09-19 NOTE — DISCUSSION/SUMMARY
[FreeTextEntry1] : \par 82 yo F with moderate carotid stenosis, HTN, HLD, obesity, former smoker, CKD here for follow up.\par \par # Progressive DEL CID: abnormal NST in 2/2020 and q wave anterior on ekg\par - consideration was for CCTA however I am wishing to avoid contrast unless for a coronary angiogram. she declined NST. I will order a coronary angiogram given risk factors/sx/abnormal ekg\par - lasix 20 PO\par - Patient instructed to maintain low salt diet, elevate legs when nonambulatory, knee-high compression stockings during the day and with prolonged travel.\par \par # HTN well controlled:\par - continue losartan 50. lasix 20. low Na diet.\par \par # PAD (mod b/l carotid stenosis): stable. recheck in 6 months due 10/2022\par - continue rosuva 5 recently started\par - aspirin 81\par \par # Obesity: \par - need to further discuss home sleep study at follow up.\par \par \par Follow up after testing. \par  Opt out

## 2024-09-22 ENCOUNTER — NON-APPOINTMENT (OUTPATIENT)
Age: 84
End: 2024-09-22

## 2024-09-23 DIAGNOSIS — E78.2 MIXED HYPERLIPIDEMIA: ICD-10-CM

## 2024-09-24 RX ORDER — EVOLOCUMAB 140 MG/ML
140 INJECTION, SOLUTION SUBCUTANEOUS
Qty: 3 | Refills: 1 | Status: DISCONTINUED | COMMUNITY
Start: 2024-09-23 | End: 2024-09-24

## 2024-09-24 RX ORDER — EVOLOCUMAB 140 MG/ML
140 INJECTION, SOLUTION SUBCUTANEOUS
Qty: 6 | Refills: 3 | Status: ACTIVE | COMMUNITY
Start: 2024-09-23

## 2024-10-01 ENCOUNTER — APPOINTMENT (OUTPATIENT)
Dept: CARDIOLOGY | Facility: CLINIC | Age: 84
End: 2024-10-01

## 2024-10-31 ENCOUNTER — APPOINTMENT (OUTPATIENT)
Dept: CARDIOLOGY | Facility: CLINIC | Age: 84
End: 2024-10-31
Payer: MEDICARE

## 2024-10-31 PROCEDURE — 93306 TTE W/DOPPLER COMPLETE: CPT

## 2024-11-04 ENCOUNTER — NON-APPOINTMENT (OUTPATIENT)
Age: 84
End: 2024-11-04

## 2024-11-04 ENCOUNTER — APPOINTMENT (OUTPATIENT)
Dept: CARDIOLOGY | Facility: CLINIC | Age: 84
End: 2024-11-04
Payer: MEDICARE

## 2024-11-04 VITALS
DIASTOLIC BLOOD PRESSURE: 62 MMHG | SYSTOLIC BLOOD PRESSURE: 134 MMHG | OXYGEN SATURATION: 97 % | BODY MASS INDEX: 40.57 KG/M2 | HEART RATE: 89 BPM | WEIGHT: 229 LBS

## 2024-11-04 DIAGNOSIS — N28.9 DISORDER OF KIDNEY AND URETER, UNSPECIFIED: ICD-10-CM

## 2024-11-04 DIAGNOSIS — R06.09 OTHER FORMS OF DYSPNEA: ICD-10-CM

## 2024-11-04 DIAGNOSIS — E78.2 MIXED HYPERLIPIDEMIA: ICD-10-CM

## 2024-11-04 DIAGNOSIS — I25.10 ATHEROSCLEROTIC HEART DISEASE OF NATIVE CORONARY ARTERY W/OUT ANGINA PECTORIS: ICD-10-CM

## 2024-11-04 DIAGNOSIS — I10 ESSENTIAL (PRIMARY) HYPERTENSION: ICD-10-CM

## 2024-11-04 DIAGNOSIS — E11.49 TYPE 2 DIABETES MELLITUS WITH OTHER DIABETIC NEUROLOGICAL COMPLICATION: ICD-10-CM

## 2024-11-04 DIAGNOSIS — R00.2 PALPITATIONS: ICD-10-CM

## 2024-11-04 PROCEDURE — 99215 OFFICE O/P EST HI 40 MIN: CPT

## 2024-11-04 PROCEDURE — 93000 ELECTROCARDIOGRAM COMPLETE: CPT

## 2024-11-04 RX ORDER — BEMPEDOIC ACID AND EZETIMIBE 180; 10 MG/1; MG/1
180-10 TABLET, FILM COATED ORAL DAILY
Qty: 90 | Refills: 3 | Status: ACTIVE | COMMUNITY
Start: 2024-11-04 | End: 1900-01-01

## 2024-11-07 ENCOUNTER — APPOINTMENT (OUTPATIENT)
Dept: OPHTHALMOLOGY | Facility: CLINIC | Age: 84
End: 2024-11-07
Payer: MEDICARE

## 2024-11-07 ENCOUNTER — NON-APPOINTMENT (OUTPATIENT)
Age: 84
End: 2024-11-07

## 2024-11-07 PROCEDURE — 92014 COMPRE OPH EXAM EST PT 1/>: CPT

## 2024-11-14 ENCOUNTER — NON-APPOINTMENT (OUTPATIENT)
Age: 84
End: 2024-11-14

## 2025-01-08 ENCOUNTER — APPOINTMENT (OUTPATIENT)
Dept: ORTHOPEDIC SURGERY | Facility: CLINIC | Age: 85
End: 2025-01-08

## 2025-02-07 ENCOUNTER — APPOINTMENT (OUTPATIENT)
Dept: CARDIOLOGY | Facility: CLINIC | Age: 85
End: 2025-02-07
Payer: MEDICARE

## 2025-02-07 VITALS
BODY MASS INDEX: 39.5 KG/M2 | SYSTOLIC BLOOD PRESSURE: 120 MMHG | WEIGHT: 223 LBS | DIASTOLIC BLOOD PRESSURE: 58 MMHG | HEART RATE: 91 BPM | OXYGEN SATURATION: 94 %

## 2025-02-07 DIAGNOSIS — I49.3 VENTRICULAR PREMATURE DEPOLARIZATION: ICD-10-CM

## 2025-02-07 DIAGNOSIS — E78.2 MIXED HYPERLIPIDEMIA: ICD-10-CM

## 2025-02-07 DIAGNOSIS — I10 ESSENTIAL (PRIMARY) HYPERTENSION: ICD-10-CM

## 2025-02-07 DIAGNOSIS — I25.10 ATHEROSCLEROTIC HEART DISEASE OF NATIVE CORONARY ARTERY W/OUT ANGINA PECTORIS: ICD-10-CM

## 2025-02-07 DIAGNOSIS — R73.03 PREDIABETES.: ICD-10-CM

## 2025-02-07 DIAGNOSIS — E11.49 TYPE 2 DIABETES MELLITUS WITH OTHER DIABETIC NEUROLOGICAL COMPLICATION: ICD-10-CM

## 2025-02-07 DIAGNOSIS — E66.9 OBESITY, UNSPECIFIED: ICD-10-CM

## 2025-02-07 DIAGNOSIS — G47.33 OBSTRUCTIVE SLEEP APNEA (ADULT) (PEDIATRIC): ICD-10-CM

## 2025-02-07 PROCEDURE — 99214 OFFICE O/P EST MOD 30 MIN: CPT

## 2025-02-07 RX ORDER — SEMAGLUTIDE 0.68 MG/ML
2 INJECTION, SOLUTION SUBCUTANEOUS
Qty: 1 | Refills: 1 | Status: ACTIVE | COMMUNITY
Start: 2025-02-07 | End: 1900-01-01

## 2025-02-14 ENCOUNTER — NON-APPOINTMENT (OUTPATIENT)
Age: 85
End: 2025-02-14

## 2025-02-24 DIAGNOSIS — S32.2XXA FRACTURE OF COCCYX, INITIAL ENCOUNTER FOR CLOSED FRACTURE: ICD-10-CM

## 2025-02-26 ENCOUNTER — NON-APPOINTMENT (OUTPATIENT)
Age: 85
End: 2025-02-26

## 2025-02-26 LAB — HBA1C MFR BLD HPLC: 6.8

## 2025-02-28 ENCOUNTER — NON-APPOINTMENT (OUTPATIENT)
Age: 85
End: 2025-02-28

## 2025-03-03 ENCOUNTER — APPOINTMENT (OUTPATIENT)
Dept: CARDIOLOGY | Facility: CLINIC | Age: 85
End: 2025-03-03
Payer: MEDICARE

## 2025-03-03 VITALS
WEIGHT: 221 LBS | HEART RATE: 77 BPM | OXYGEN SATURATION: 96 % | DIASTOLIC BLOOD PRESSURE: 58 MMHG | BODY MASS INDEX: 39.15 KG/M2 | SYSTOLIC BLOOD PRESSURE: 122 MMHG

## 2025-03-03 DIAGNOSIS — I49.9 CARDIAC ARRHYTHMIA, UNSPECIFIED: ICD-10-CM

## 2025-03-03 DIAGNOSIS — I10 ESSENTIAL (PRIMARY) HYPERTENSION: ICD-10-CM

## 2025-03-03 DIAGNOSIS — E78.2 MIXED HYPERLIPIDEMIA: ICD-10-CM

## 2025-03-03 DIAGNOSIS — I25.10 ATHEROSCLEROTIC HEART DISEASE OF NATIVE CORONARY ARTERY W/OUT ANGINA PECTORIS: ICD-10-CM

## 2025-03-03 DIAGNOSIS — G47.33 OBSTRUCTIVE SLEEP APNEA (ADULT) (PEDIATRIC): ICD-10-CM

## 2025-03-03 DIAGNOSIS — I49.3 VENTRICULAR PREMATURE DEPOLARIZATION: ICD-10-CM

## 2025-03-03 DIAGNOSIS — N28.9 DISORDER OF KIDNEY AND URETER, UNSPECIFIED: ICD-10-CM

## 2025-03-03 DIAGNOSIS — E11.49 TYPE 2 DIABETES MELLITUS WITH OTHER DIABETIC NEUROLOGICAL COMPLICATION: ICD-10-CM

## 2025-03-03 PROCEDURE — 99214 OFFICE O/P EST MOD 30 MIN: CPT

## 2025-03-03 RX ORDER — EZETIMIBE 10 MG/1
10 TABLET ORAL DAILY
Qty: 90 | Refills: 2 | Status: ACTIVE | COMMUNITY
Start: 2025-03-03 | End: 1900-01-01

## 2025-03-12 RX ORDER — BEMPEDOIC ACID 180 MG/1
180 TABLET, FILM COATED ORAL DAILY
Qty: 90 | Refills: 2 | Status: ACTIVE | COMMUNITY
Start: 2025-03-03

## 2025-03-25 DIAGNOSIS — R00.2 PALPITATIONS: ICD-10-CM

## 2025-03-25 DIAGNOSIS — I49.9 CARDIAC ARRHYTHMIA, UNSPECIFIED: ICD-10-CM

## 2025-03-27 ENCOUNTER — APPOINTMENT (OUTPATIENT)
Dept: CARDIOLOGY | Facility: CLINIC | Age: 85
End: 2025-03-27

## 2025-04-15 LAB — HBA1C MFR BLD HPLC: 6.1

## 2025-04-29 ENCOUNTER — APPOINTMENT (OUTPATIENT)
Dept: CARDIOLOGY | Facility: CLINIC | Age: 85
End: 2025-04-29
Payer: MEDICARE

## 2025-04-29 VITALS
SYSTOLIC BLOOD PRESSURE: 138 MMHG | DIASTOLIC BLOOD PRESSURE: 70 MMHG | BODY MASS INDEX: 37.56 KG/M2 | OXYGEN SATURATION: 97 % | WEIGHT: 212 LBS | HEIGHT: 63 IN | HEART RATE: 80 BPM

## 2025-04-29 DIAGNOSIS — I65.23 OCCLUSION AND STENOSIS OF BILATERAL CAROTID ARTERIES: ICD-10-CM

## 2025-04-29 DIAGNOSIS — I25.10 ATHEROSCLEROTIC HEART DISEASE OF NATIVE CORONARY ARTERY W/OUT ANGINA PECTORIS: ICD-10-CM

## 2025-04-29 DIAGNOSIS — I49.3 VENTRICULAR PREMATURE DEPOLARIZATION: ICD-10-CM

## 2025-04-29 DIAGNOSIS — E78.2 MIXED HYPERLIPIDEMIA: ICD-10-CM

## 2025-04-29 DIAGNOSIS — I10 ESSENTIAL (PRIMARY) HYPERTENSION: ICD-10-CM

## 2025-04-29 PROCEDURE — 99214 OFFICE O/P EST MOD 30 MIN: CPT

## 2025-04-30 RX ORDER — EVOLOCUMAB 140 MG/ML
140 INJECTION, SOLUTION SUBCUTANEOUS
Qty: 3 | Refills: 1 | Status: ACTIVE | COMMUNITY
Start: 2025-04-29

## 2025-05-29 ENCOUNTER — NON-APPOINTMENT (OUTPATIENT)
Age: 85
End: 2025-05-29

## 2025-05-29 ENCOUNTER — APPOINTMENT (OUTPATIENT)
Dept: OPHTHALMOLOGY | Facility: CLINIC | Age: 85
End: 2025-05-29
Payer: MEDICARE

## 2025-05-29 PROCEDURE — 99214 OFFICE O/P EST MOD 30 MIN: CPT

## 2025-06-23 ENCOUNTER — APPOINTMENT (OUTPATIENT)
Dept: CARDIOLOGY | Facility: CLINIC | Age: 85
End: 2025-06-23

## 2025-07-21 ENCOUNTER — APPOINTMENT (OUTPATIENT)
Dept: CARDIOLOGY | Facility: CLINIC | Age: 85
End: 2025-07-21

## 2025-08-04 ENCOUNTER — NON-APPOINTMENT (OUTPATIENT)
Age: 85
End: 2025-08-04

## 2025-08-04 ENCOUNTER — APPOINTMENT (OUTPATIENT)
Dept: CARDIOLOGY | Facility: CLINIC | Age: 85
End: 2025-08-04
Payer: MEDICARE

## 2025-08-04 VITALS
DIASTOLIC BLOOD PRESSURE: 74 MMHG | HEART RATE: 73 BPM | SYSTOLIC BLOOD PRESSURE: 124 MMHG | OXYGEN SATURATION: 95 % | BODY MASS INDEX: 41.11 KG/M2 | HEIGHT: 63 IN | WEIGHT: 232 LBS

## 2025-08-04 DIAGNOSIS — G47.33 OBSTRUCTIVE SLEEP APNEA (ADULT) (PEDIATRIC): ICD-10-CM

## 2025-08-04 DIAGNOSIS — E11.49 TYPE 2 DIABETES MELLITUS WITH OTHER DIABETIC NEUROLOGICAL COMPLICATION: ICD-10-CM

## 2025-08-04 DIAGNOSIS — E78.2 MIXED HYPERLIPIDEMIA: ICD-10-CM

## 2025-08-04 DIAGNOSIS — E66.9 OBESITY, UNSPECIFIED: ICD-10-CM

## 2025-08-04 DIAGNOSIS — I10 ESSENTIAL (PRIMARY) HYPERTENSION: ICD-10-CM

## 2025-08-04 PROCEDURE — 99214 OFFICE O/P EST MOD 30 MIN: CPT

## 2025-08-04 RX ORDER — SEMAGLUTIDE 0.68 MG/ML
2 INJECTION, SOLUTION SUBCUTANEOUS
Qty: 1 | Refills: 1 | Status: ACTIVE | COMMUNITY
Start: 2025-08-04 | End: 1900-01-01

## 2025-08-04 RX ORDER — TIRZEPATIDE 2.5 MG/.5ML
2.5 INJECTION, SOLUTION SUBCUTANEOUS
Qty: 1 | Refills: 0 | Status: DISCONTINUED | COMMUNITY
Start: 2025-08-04 | End: 2025-08-04

## 2025-08-21 ENCOUNTER — NON-APPOINTMENT (OUTPATIENT)
Age: 85
End: 2025-08-21

## 2025-09-04 ENCOUNTER — APPOINTMENT (OUTPATIENT)
Dept: CARDIOLOGY | Facility: CLINIC | Age: 85
End: 2025-09-04
Payer: MEDICARE

## 2025-09-04 PROCEDURE — 93880 EXTRACRANIAL BILAT STUDY: CPT
